# Patient Record
Sex: FEMALE | Race: WHITE | NOT HISPANIC OR LATINO | Employment: UNEMPLOYED | URBAN - METROPOLITAN AREA
[De-identification: names, ages, dates, MRNs, and addresses within clinical notes are randomized per-mention and may not be internally consistent; named-entity substitution may affect disease eponyms.]

---

## 2017-02-10 ENCOUNTER — HISTORICAL (OUTPATIENT)
Dept: RADIOLOGY | Facility: HOSPITAL | Age: 57
End: 2017-02-10

## 2017-07-11 ENCOUNTER — HISTORICAL (OUTPATIENT)
Dept: LAB | Facility: HOSPITAL | Age: 57
End: 2017-07-11

## 2017-07-11 LAB
ABS NEUT (OLG): 6.4 X10(3)/MCL (ref 1.5–6.9)
ALBUMIN SERPL-MCNC: 3.4 GM/DL (ref 3.4–5)
ALBUMIN/GLOB SERPL: 0.7 RATIO
ALP SERPL-CCNC: 118 UNIT/L (ref 30–113)
ALT SERPL-CCNC: 45 UNIT/L (ref 10–45)
APPEARANCE, UA: CLEAR
AST SERPL-CCNC: 18 UNIT/L (ref 15–37)
BACTERIA SPEC CULT: NORMAL /HPF
BASOPHILS # BLD AUTO: 0 X10(3)/MCL (ref 0–0.1)
BASOPHILS NFR BLD AUTO: 0 % (ref 0–1)
BILIRUB SERPL-MCNC: 0.4 MG/DL (ref 0.1–0.9)
BILIRUB UR QL STRIP: NEGATIVE
BILIRUBIN DIRECT+TOT PNL SERPL-MCNC: 0.1 MG/DL (ref 0–0.3)
BILIRUBIN DIRECT+TOT PNL SERPL-MCNC: 0.3 MG/DL
BUN SERPL-MCNC: 10 MG/DL (ref 10–20)
CALCIUM SERPL-MCNC: 9.8 MG/DL (ref 8–10.5)
CHLORIDE SERPL-SCNC: 101 MMOL/L (ref 100–108)
CO2 SERPL-SCNC: 31 MMOL/L (ref 21–35)
COLOR UR: YELLOW
CREAT SERPL-MCNC: 0.86 MG/DL (ref 0.7–1.3)
DEPRECATED CALCIDIOL+CALCIFEROL SERPL-MC: 31.32 NG/ML (ref 30–80)
EOSINOPHIL # BLD AUTO: 0.1 X10(3)/MCL (ref 0–0.6)
EOSINOPHIL NFR BLD AUTO: 1 % (ref 0–5)
ERYTHROCYTE [DISTWIDTH] IN BLOOD BY AUTOMATED COUNT: 15.3 % (ref 11.5–17)
GLOBULIN SER-MCNC: 4.7 GM/DL
GLUCOSE (UA): NEGATIVE
GLUCOSE SERPL-MCNC: 164 MG/DL (ref 75–116)
HCT VFR BLD AUTO: 44.6 % (ref 36–48)
HGB BLD-MCNC: 14 GM/DL (ref 12–16)
HGB UR QL STRIP: NEGATIVE
KETONES UR QL STRIP: NEGATIVE
LEUKOCYTE ESTERASE UR QL STRIP: ABNORMAL
LYMPHOCYTES # BLD AUTO: 2.7 X10(3)/MCL (ref 0.5–4.1)
LYMPHOCYTES NFR BLD AUTO: 26.9 % (ref 15–40)
MCH RBC QN AUTO: 28 PG (ref 27–34)
MCHC RBC AUTO-ENTMCNC: 31 GM/DL (ref 31–36)
MCV RBC AUTO: 90 FL (ref 80–99)
MONOCYTES # BLD AUTO: 0.7 X10(3)/MCL (ref 0–1.1)
MONOCYTES NFR BLD AUTO: 7 % (ref 4–12)
NEUTROPHILS # BLD AUTO: 6.4 X10(3)/MCL (ref 1.5–6.9)
NEUTROPHILS NFR BLD AUTO: 64 % (ref 43–75)
NITRITE UR QL STRIP: NEGATIVE
PH UR STRIP: 6 [PH]
PLATELET # BLD AUTO: 465 X10(3)/MCL (ref 140–400)
PMV BLD AUTO: 8.6 FL (ref 6.8–10)
POTASSIUM SERPL-SCNC: 4.8 MMOL/L (ref 3.6–5.2)
PROT SERPL-MCNC: 8.1 GM/DL (ref 6.4–8.2)
PROT UR QL STRIP: NEGATIVE
RBC # BLD AUTO: 4.97 X10(6)/MCL (ref 4.2–5.4)
RBC #/AREA URNS HPF: NORMAL /[HPF]
SODIUM SERPL-SCNC: 138 MMOL/L (ref 135–145)
SP GR UR STRIP: 1.02
SQUAMOUS EPITHELIAL, UA: NORMAL /LPF
TSH SERPL-ACNC: 3.2 MIU/ML (ref 0.36–3.74)
UROBILINOGEN UR STRIP-ACNC: 0.2 EU/DL
WBC # SPEC AUTO: 10.1 X10(3)/MCL (ref 4.5–11.5)
WBC #/AREA URNS HPF: NORMAL /HPF

## 2017-07-13 LAB — FINAL CULTURE: NO GROWTH

## 2017-07-28 ENCOUNTER — HISTORICAL (OUTPATIENT)
Dept: LAB | Facility: HOSPITAL | Age: 57
End: 2017-07-28

## 2017-07-28 LAB
ALBUMIN SERPL-MCNC: 3.5 GM/DL (ref 3.4–5)
ALBUMIN/GLOB SERPL: 0.8 RATIO
ALP SERPL-CCNC: 128 UNIT/L (ref 30–113)
ALT SERPL-CCNC: 37 UNIT/L (ref 10–45)
AST SERPL-CCNC: 16 UNIT/L (ref 15–37)
BILIRUB SERPL-MCNC: 0.4 MG/DL (ref 0.1–0.9)
BILIRUBIN DIRECT+TOT PNL SERPL-MCNC: 0.1 MG/DL (ref 0–0.3)
BILIRUBIN DIRECT+TOT PNL SERPL-MCNC: 0.3 MG/DL
BUN SERPL-MCNC: 11 MG/DL (ref 10–20)
CALCIUM SERPL-MCNC: 9.3 MG/DL (ref 8–10.5)
CHLORIDE SERPL-SCNC: 101 MMOL/L (ref 100–108)
CHOLEST SERPL-MCNC: 192 MG/DL (ref 140–200)
CHOLEST/HDLC SERPL: 3 MG/DL (ref 35–59)
CO2 SERPL-SCNC: 32 MMOL/L (ref 21–35)
CREAT SERPL-MCNC: 0.85 MG/DL (ref 0.7–1.3)
GLOBULIN SER-MCNC: 4.3 GM/DL
GLUCOSE SERPL-MCNC: 144 MG/DL (ref 75–116)
HDLC SERPL-MCNC: 61 MG/DL (ref 35–59)
LDLC SERPL CALC-MCNC: 106 MG/DL (ref 100–129)
POTASSIUM SERPL-SCNC: 4.8 MMOL/L (ref 3.6–5.2)
PROT SERPL-MCNC: 7.8 GM/DL (ref 6.4–8.2)
SODIUM SERPL-SCNC: 138 MMOL/L (ref 135–145)
TRIGL SERPL-MCNC: 111 MG/DL (ref 35–150)
VLDLC SERPL CALC-MCNC: 22 MG/DL

## 2017-11-27 ENCOUNTER — HISTORICAL (OUTPATIENT)
Dept: LAB | Facility: HOSPITAL | Age: 57
End: 2017-11-27

## 2017-11-27 LAB
CRP SERPL-MCNC: 2.9 MG/DL (ref 0–0.9)
ERYTHROCYTE [SEDIMENTATION RATE] IN BLOOD: 26 MM/HR (ref 0–20)
RHEUMATOID FACT SERPL-ACNC: 10 IU/ML (ref 0–15)
URATE SERPL-MCNC: 5.6 MG/DL (ref 2.6–7.2)

## 2017-12-06 ENCOUNTER — HISTORICAL (OUTPATIENT)
Dept: LAB | Facility: HOSPITAL | Age: 57
End: 2017-12-06

## 2017-12-06 LAB
ALBUMIN SERPL-MCNC: 3.4 GM/DL (ref 3.4–5)
ALBUMIN/GLOB SERPL: 0.8 RATIO
ALP SERPL-CCNC: 115 UNIT/L (ref 30–113)
ALT SERPL-CCNC: 50 UNIT/L (ref 10–45)
AST SERPL-CCNC: 25 UNIT/L (ref 15–37)
BILIRUB SERPL-MCNC: 0.4 MG/DL (ref 0.1–0.9)
BILIRUBIN DIRECT+TOT PNL SERPL-MCNC: 0.1 MG/DL (ref 0–0.3)
BILIRUBIN DIRECT+TOT PNL SERPL-MCNC: 0.3 MG/DL
BUN SERPL-MCNC: 13 MG/DL (ref 10–20)
CALCIUM SERPL-MCNC: 9.2 MG/DL (ref 8–10.5)
CHLORIDE SERPL-SCNC: 98 MMOL/L (ref 100–108)
CHOLEST SERPL-MCNC: 197 MG/DL (ref 140–200)
CHOLEST/HDLC SERPL: 2 MG/DL (ref 0–8)
CO2 SERPL-SCNC: 29 MMOL/L (ref 21–35)
CREAT SERPL-MCNC: 0.87 MG/DL (ref 0.7–1.3)
GLOBULIN SER-MCNC: 4.3 GM/DL
GLUCOSE SERPL-MCNC: 218 MG/DL (ref 75–116)
HDLC SERPL-MCNC: 84 MG/DL (ref 35–59)
LDLC SERPL CALC-MCNC: 103 MG/DL (ref 100–129)
POTASSIUM SERPL-SCNC: 4.4 MMOL/L (ref 3.6–5.2)
PROT SERPL-MCNC: 7.7 GM/DL (ref 6.4–8.2)
SODIUM SERPL-SCNC: 135 MMOL/L (ref 135–145)
TRIGL SERPL-MCNC: 121 MG/DL (ref 35–150)
VLDLC SERPL CALC-MCNC: 24 MG/DL

## 2017-12-19 ENCOUNTER — HISTORICAL (OUTPATIENT)
Dept: SURGERY | Facility: HOSPITAL | Age: 57
End: 2017-12-19

## 2017-12-19 LAB
ABS NEUT (OLG): 6.5 X10(3)/MCL (ref 1.5–6.9)
ALBUMIN SERPL-MCNC: 3.4 GM/DL (ref 3.4–5)
ALBUMIN/GLOB SERPL: 0.8 RATIO
ALP SERPL-CCNC: 115 UNIT/L (ref 30–113)
ALT SERPL-CCNC: 52 UNIT/L (ref 10–45)
APTT PPP: 26.3 SECOND(S) (ref 25–35)
AST SERPL-CCNC: 23 UNIT/L (ref 15–37)
BILIRUB SERPL-MCNC: 0.5 MG/DL (ref 0.1–0.9)
BILIRUBIN DIRECT+TOT PNL SERPL-MCNC: 0.2 MG/DL (ref 0–0.3)
BILIRUBIN DIRECT+TOT PNL SERPL-MCNC: 0.3 MG/DL
BUN SERPL-MCNC: 11 MG/DL (ref 10–20)
CALCIUM SERPL-MCNC: 9.3 MG/DL (ref 8–10.5)
CHLORIDE SERPL-SCNC: 98 MMOL/L (ref 100–108)
CO2 SERPL-SCNC: 28 MMOL/L (ref 21–35)
CREAT SERPL-MCNC: 0.72 MG/DL (ref 0.7–1.3)
ERYTHROCYTE [DISTWIDTH] IN BLOOD BY AUTOMATED COUNT: 14.3 % (ref 11.5–17)
GLOBULIN SER-MCNC: 4.3 GM/DL
GLUCOSE SERPL-MCNC: 183 MG/DL (ref 75–116)
HCT VFR BLD AUTO: 41.6 % (ref 36–48)
HGB BLD-MCNC: 13.1 GM/DL (ref 12–16)
INR PPP: 0.9 (ref 0–1.2)
MCH RBC QN AUTO: 28 PG (ref 27–34)
MCHC RBC AUTO-ENTMCNC: 32 GM/DL (ref 31–36)
MCV RBC AUTO: 90 FL (ref 80–99)
PLATELET # BLD AUTO: 375 X10(3)/MCL (ref 140–400)
PMV BLD AUTO: 9.1 FL (ref 6.8–10)
POTASSIUM SERPL-SCNC: 4.2 MMOL/L (ref 3.6–5.2)
PROT SERPL-MCNC: 7.7 GM/DL (ref 6.4–8.2)
PROTHROMBIN TIME: 9.8 SECOND(S) (ref 9–12)
RBC # BLD AUTO: 4.63 X10(6)/MCL (ref 4.2–5.4)
SODIUM SERPL-SCNC: 135 MMOL/L (ref 135–145)
WBC # SPEC AUTO: 9.1 X10(3)/MCL (ref 4.5–11.5)

## 2017-12-21 ENCOUNTER — HISTORICAL (OUTPATIENT)
Dept: ANESTHESIOLOGY | Facility: HOSPITAL | Age: 57
End: 2017-12-21

## 2018-02-27 ENCOUNTER — HISTORICAL (OUTPATIENT)
Dept: LAB | Facility: HOSPITAL | Age: 58
End: 2018-02-27

## 2018-02-27 LAB
ALBUMIN SERPL-MCNC: 3.4 GM/DL (ref 3.4–5)
ALBUMIN/GLOB SERPL: 0.8 RATIO
ALP SERPL-CCNC: 108 UNIT/L (ref 30–113)
ALT SERPL-CCNC: 37 UNIT/L (ref 10–45)
AST SERPL-CCNC: 22 UNIT/L (ref 15–37)
BILIRUB SERPL-MCNC: 0.3 MG/DL (ref 0.1–0.9)
BILIRUBIN DIRECT+TOT PNL SERPL-MCNC: 0.1 MG/DL (ref 0–0.3)
BILIRUBIN DIRECT+TOT PNL SERPL-MCNC: 0.2 MG/DL
BUN SERPL-MCNC: 9 MG/DL (ref 10–20)
CALCIUM SERPL-MCNC: 8.8 MG/DL (ref 8–10.5)
CHLORIDE SERPL-SCNC: 99 MMOL/L (ref 100–108)
CHOLEST SERPL-MCNC: 153 MG/DL (ref 140–200)
CHOLEST/HDLC SERPL: 2 MG/DL (ref 0–8)
CO2 SERPL-SCNC: 32 MMOL/L (ref 21–35)
CREAT SERPL-MCNC: 0.75 MG/DL (ref 0.7–1.3)
EST. AVERAGE GLUCOSE BLD GHB EST-MCNC: 246 MG/DL
GLOBULIN SER-MCNC: 4.2 GM/DL
GLUCOSE SERPL-MCNC: 201 MG/DL (ref 75–116)
HBA1C MFR BLD: 10.2 % (ref 4.8–6)
HDLC SERPL-MCNC: 74 MG/DL (ref 35–59)
LDLC SERPL CALC-MCNC: 67 MG/DL (ref 100–129)
POTASSIUM SERPL-SCNC: 4.5 MMOL/L (ref 3.6–5.2)
PROT SERPL-MCNC: 7.6 GM/DL (ref 6.4–8.2)
SODIUM SERPL-SCNC: 137 MMOL/L (ref 135–145)
TRIGL SERPL-MCNC: 103 MG/DL (ref 35–150)
VLDLC SERPL CALC-MCNC: 21 MG/DL

## 2018-03-20 ENCOUNTER — HISTORICAL (OUTPATIENT)
Dept: RADIOLOGY | Facility: HOSPITAL | Age: 58
End: 2018-03-20

## 2018-03-27 ENCOUNTER — HISTORICAL (OUTPATIENT)
Dept: SURGERY | Facility: HOSPITAL | Age: 58
End: 2018-03-27

## 2018-03-27 LAB
ABS NEUT (OLG): 8.1 X10(3)/MCL (ref 1.5–6.9)
ALBUMIN SERPL-MCNC: 3.4 GM/DL (ref 3.4–5)
ALBUMIN/GLOB SERPL: 0.7 RATIO
ALP SERPL-CCNC: 98 UNIT/L (ref 30–113)
ALT SERPL-CCNC: 34 UNIT/L (ref 10–45)
APTT PPP: 27.9 SECOND(S) (ref 25–35)
AST SERPL-CCNC: 15 UNIT/L (ref 15–37)
BASOPHILS # BLD AUTO: 0 X10(3)/MCL (ref 0–0.1)
BASOPHILS NFR BLD AUTO: 0 % (ref 0–1)
BILIRUB SERPL-MCNC: 0.3 MG/DL (ref 0.1–0.9)
BILIRUBIN DIRECT+TOT PNL SERPL-MCNC: 0.1 MG/DL (ref 0–0.3)
BILIRUBIN DIRECT+TOT PNL SERPL-MCNC: 0.2 MG/DL
BUN SERPL-MCNC: 14 MG/DL (ref 10–20)
CALCIUM SERPL-MCNC: 8.9 MG/DL (ref 8–10.5)
CHLORIDE SERPL-SCNC: 100 MMOL/L (ref 100–108)
CO2 SERPL-SCNC: 28 MMOL/L (ref 21–35)
CREAT SERPL-MCNC: 0.76 MG/DL (ref 0.7–1.3)
EOSINOPHIL # BLD AUTO: 0.2 X10(3)/MCL (ref 0–0.6)
EOSINOPHIL NFR BLD AUTO: 2 % (ref 0–5)
ERYTHROCYTE [DISTWIDTH] IN BLOOD BY AUTOMATED COUNT: 14.1 % (ref 11.5–17)
GLOBULIN SER-MCNC: 4.6 GM/DL
GLUCOSE SERPL-MCNC: 123 MG/DL (ref 75–116)
GROUP & RH: NORMAL
HCT VFR BLD AUTO: 40 % (ref 36–48)
HGB BLD-MCNC: 12.3 GM/DL (ref 12–16)
INR PPP: 0.9 (ref 0–1.2)
LYMPHOCYTES # BLD AUTO: 2.4 X10(3)/MCL (ref 0.5–4.1)
LYMPHOCYTES NFR BLD AUTO: 20.9 % (ref 15–40)
MCH RBC QN AUTO: 27 PG (ref 27–34)
MCHC RBC AUTO-ENTMCNC: 31 GM/DL (ref 31–36)
MCV RBC AUTO: 89 FL (ref 80–99)
MONOCYTES # BLD AUTO: 0.8 X10(3)/MCL (ref 0–1.1)
MONOCYTES NFR BLD AUTO: 7 % (ref 4–12)
NEUTROPHILS # BLD AUTO: 8.1 X10(3)/MCL (ref 1.5–6.9)
NEUTROPHILS NFR BLD AUTO: 70 % (ref 43–75)
PLATELET # BLD AUTO: 466 X10(3)/MCL (ref 140–400)
PMV BLD AUTO: 8.5 FL (ref 6.8–10)
POTASSIUM SERPL-SCNC: 4.3 MMOL/L (ref 3.6–5.2)
PROT SERPL-MCNC: 8 GM/DL (ref 6.4–8.2)
PROTHROMBIN TIME: 9.6 SECOND(S) (ref 9–12)
RBC # BLD AUTO: 4.5 X10(6)/MCL (ref 4.2–5.4)
SODIUM SERPL-SCNC: 138 MMOL/L (ref 135–145)
WBC # SPEC AUTO: 11.6 X10(3)/MCL (ref 4.5–11.5)

## 2018-03-28 ENCOUNTER — HISTORICAL (OUTPATIENT)
Dept: MEDSURG UNIT | Facility: HOSPITAL | Age: 58
End: 2018-03-28

## 2018-03-29 LAB
ABS NEUT (OLG): 5.4 X10(3)/MCL (ref 1.5–6.9)
ALBUMIN SERPL-MCNC: 2.7 GM/DL (ref 3.4–5)
ALBUMIN/GLOB SERPL: 0.7 RATIO
ALP SERPL-CCNC: 69 UNIT/L (ref 30–113)
ALT SERPL-CCNC: 33 UNIT/L (ref 10–45)
AST SERPL-CCNC: 22 UNIT/L (ref 15–37)
BASOPHILS # BLD AUTO: 0 X10(3)/MCL (ref 0–0.1)
BASOPHILS NFR BLD AUTO: 0 % (ref 0–1)
BILIRUB SERPL-MCNC: 0.3 MG/DL (ref 0.1–0.9)
BILIRUBIN DIRECT+TOT PNL SERPL-MCNC: 0.1 MG/DL (ref 0–0.3)
BILIRUBIN DIRECT+TOT PNL SERPL-MCNC: 0.2 MG/DL
BUN SERPL-MCNC: 9 MG/DL (ref 10–20)
CALCIUM SERPL-MCNC: 7.9 MG/DL (ref 8–10.5)
CHLORIDE SERPL-SCNC: 99 MMOL/L (ref 100–108)
CO2 SERPL-SCNC: 31 MMOL/L (ref 21–35)
CREAT SERPL-MCNC: 0.68 MG/DL (ref 0.7–1.3)
EOSINOPHIL # BLD AUTO: 0.1 X10(3)/MCL (ref 0–0.6)
EOSINOPHIL NFR BLD AUTO: 1 % (ref 0–5)
ERYTHROCYTE [DISTWIDTH] IN BLOOD BY AUTOMATED COUNT: 14.4 % (ref 11.5–17)
GLOBULIN SER-MCNC: 3.9 GM/DL
GLUCOSE SERPL-MCNC: 99 MG/DL (ref 75–116)
HCT VFR BLD AUTO: 34.3 % (ref 36–48)
HGB BLD-MCNC: 10.2 GM/DL (ref 12–16)
LYMPHOCYTES # BLD AUTO: 0.9 X10(3)/MCL (ref 0.5–4.1)
LYMPHOCYTES NFR BLD AUTO: 13 % (ref 15–40)
MAGNESIUM SERPL-MCNC: 1.8 MG/DL (ref 1.8–2.4)
MCH RBC QN AUTO: 27 PG (ref 27–34)
MCHC RBC AUTO-ENTMCNC: 30 GM/DL (ref 31–36)
MCV RBC AUTO: 90 FL (ref 80–99)
MONOCYTES # BLD AUTO: 0.7 X10(3)/MCL (ref 0–1.1)
MONOCYTES NFR BLD AUTO: 10 % (ref 4–12)
NEUTROPHILS # BLD AUTO: 5.4 X10(3)/MCL (ref 1.5–6.9)
NEUTROPHILS NFR BLD AUTO: 76 % (ref 43–75)
PHOSPHATE SERPL-MCNC: 3.4 MG/DL (ref 2.6–4.7)
PLATELET # BLD AUTO: 367 X10(3)/MCL (ref 140–400)
PMV BLD AUTO: 8.3 FL (ref 6.8–10)
POTASSIUM SERPL-SCNC: 4.1 MMOL/L (ref 3.6–5.2)
PROT SERPL-MCNC: 6.6 GM/DL (ref 6.4–8.2)
RBC # BLD AUTO: 3.8 X10(6)/MCL (ref 4.2–5.4)
SODIUM SERPL-SCNC: 137 MMOL/L (ref 135–145)
WBC # SPEC AUTO: 7.2 X10(3)/MCL (ref 4.5–11.5)

## 2018-03-30 LAB — FINAL CULTURE: NORMAL

## 2018-04-02 ENCOUNTER — HOSPITAL ENCOUNTER (OUTPATIENT)
Dept: MEDSURG UNIT | Facility: HOSPITAL | Age: 58
End: 2018-04-04
Attending: INTERNAL MEDICINE | Admitting: INTERNAL MEDICINE

## 2018-04-02 LAB
ABS NEUT (OLG): 11.6 X10(3)/MCL (ref 1.5–6.9)
ALBUMIN SERPL-MCNC: 2.9 GM/DL (ref 3.4–5)
ALBUMIN/GLOB SERPL: 0.6 RATIO
ALP SERPL-CCNC: 466 UNIT/L (ref 30–113)
ALT SERPL-CCNC: 531 UNIT/L (ref 10–45)
AMYLASE SERPL-CCNC: 17 UNIT/L (ref 25–115)
APPEARANCE, UA: CLEAR
AST SERPL-CCNC: 293 UNIT/L (ref 15–37)
BACTERIA SPEC CULT: NORMAL /HPF
BASOPHILS # BLD AUTO: 0 X10(3)/MCL (ref 0–0.1)
BASOPHILS NFR BLD AUTO: 0 % (ref 0–1)
BILIRUB SERPL-MCNC: 1.3 MG/DL (ref 0.1–0.9)
BILIRUB UR QL STRIP: ABNORMAL
BILIRUBIN DIRECT+TOT PNL SERPL-MCNC: 0.3 MG/DL
BILIRUBIN DIRECT+TOT PNL SERPL-MCNC: 1 MG/DL (ref 0–0.3)
BUN SERPL-MCNC: 7 MG/DL (ref 10–20)
CALCIUM SERPL-MCNC: 9 MG/DL (ref 8–10.5)
CHLORIDE SERPL-SCNC: 95 MMOL/L (ref 100–108)
CO2 SERPL-SCNC: 23 MMOL/L (ref 21–35)
COLOR UR: ABNORMAL
CREAT SERPL-MCNC: 0.93 MG/DL (ref 0.7–1.3)
EOSINOPHIL # BLD AUTO: 0.1 X10(3)/MCL (ref 0–0.6)
EOSINOPHIL NFR BLD AUTO: 1 % (ref 0–5)
ERYTHROCYTE [DISTWIDTH] IN BLOOD BY AUTOMATED COUNT: 14.2 % (ref 11.5–17)
GLOBULIN SER-MCNC: 5.2 GM/DL
GLUCOSE (UA): 100 MG/DL
GLUCOSE SERPL-MCNC: 148 MG/DL (ref 75–116)
HCT VFR BLD AUTO: 35.2 % (ref 36–48)
HGB BLD-MCNC: 11.2 GM/DL (ref 12–16)
HGB UR QL STRIP: NEGATIVE
KETONES UR QL STRIP: ABNORMAL
LACTATE SERPL-SCNC: 3 MMOL/L (ref 0.4–2)
LEUKOCYTE ESTERASE UR QL STRIP: NEGATIVE
LIPASE SERPL-CCNC: 39 UNIT/L (ref 21–261)
LYMPHOCYTES # BLD AUTO: 0.4 X10(3)/MCL (ref 0.5–4.1)
LYMPHOCYTES NFR BLD AUTO: 3.2 % (ref 15–40)
MCH RBC QN AUTO: 28 PG (ref 27–34)
MCHC RBC AUTO-ENTMCNC: 32 GM/DL (ref 31–36)
MCV RBC AUTO: 88 FL (ref 80–99)
MONOCYTES # BLD AUTO: 0.5 X10(3)/MCL (ref 0–1.1)
MONOCYTES NFR BLD AUTO: 4 % (ref 4–12)
NEUTROPHILS # BLD AUTO: 11.6 X10(3)/MCL (ref 1.5–6.9)
NEUTROPHILS NFR BLD AUTO: 92 % (ref 43–75)
NITRITE UR QL STRIP: NEGATIVE
PH UR STRIP: 6.5 [PH]
PLATELET # BLD AUTO: 494 X10(3)/MCL (ref 140–400)
PMV BLD AUTO: 8.4 FL (ref 6.8–10)
POTASSIUM SERPL-SCNC: 5.1 MMOL/L (ref 3.6–5.2)
PROT SERPL-MCNC: 8.1 GM/DL (ref 6.4–8.2)
PROT UR QL STRIP: 30 MG/DL
RBC # BLD AUTO: 4.01 X10(6)/MCL (ref 4.2–5.4)
RBC #/AREA URNS HPF: NORMAL /HPF
SODIUM SERPL-SCNC: 130 MMOL/L (ref 135–145)
SP GR UR STRIP: 1.01
SQUAMOUS EPITHELIAL, UA: NORMAL
TSH SERPL-ACNC: 2.42 MIU/ML (ref 0.36–3.74)
UROBILINOGEN UR STRIP-ACNC: 4 EU/DL
WBC # SPEC AUTO: 12.7 X10(3)/MCL (ref 4.5–11.5)
WBC #/AREA URNS HPF: NORMAL /HPF

## 2018-04-03 LAB
APAP SERPL-MCNC: 0 MCG/ML (ref 10–30)
CK MB SERPL-MCNC: 0.5 NG/ML (ref 0–3.6)
CK SERPL-CCNC: 39 UNIT/L (ref 39–225)
LACTATE SERPL-SCNC: 1.3 MMOL/L (ref 0.4–2)
LACTATE SERPL-SCNC: 1.8 MMOL/L (ref 0.4–2)
TROPONIN I SERPL-MCNC: <0.017 NG/ML (ref 0–0.06)

## 2018-04-04 LAB
ABS NEUT (OLG): 4.9 X10(3)/MCL (ref 1.5–6.9)
ALBUMIN SERPL-MCNC: 2.1 GM/DL (ref 3.4–5)
ALBUMIN/GLOB SERPL: 0.5 RATIO
ALP SERPL-CCNC: 334 UNIT/L (ref 30–113)
ALT SERPL-CCNC: 316 UNIT/L (ref 10–45)
AST SERPL-CCNC: 93 UNIT/L (ref 15–37)
BASOPHILS # BLD AUTO: 0 X10(3)/MCL (ref 0–0.1)
BASOPHILS NFR BLD AUTO: 0 % (ref 0–1)
BILIRUB SERPL-MCNC: 0.8 MG/DL (ref 0.1–0.9)
BILIRUBIN DIRECT+TOT PNL SERPL-MCNC: 0.2 MG/DL
BILIRUBIN DIRECT+TOT PNL SERPL-MCNC: 0.6 MG/DL (ref 0–0.3)
BUN SERPL-MCNC: 5 MG/DL (ref 10–20)
CALCIUM SERPL-MCNC: 8 MG/DL (ref 8–10.5)
CHLORIDE SERPL-SCNC: 104 MMOL/L (ref 100–108)
CO2 SERPL-SCNC: 24 MMOL/L (ref 21–35)
CREAT SERPL-MCNC: 0.64 MG/DL (ref 0.7–1.3)
EOSINOPHIL # BLD AUTO: 0.2 X10(3)/MCL (ref 0–0.6)
EOSINOPHIL NFR BLD AUTO: 3 % (ref 0–5)
ERYTHROCYTE [DISTWIDTH] IN BLOOD BY AUTOMATED COUNT: 14.8 % (ref 11.5–17)
GLOBULIN SER-MCNC: 4.2 GM/DL
GLUCOSE SERPL-MCNC: 155 MG/DL (ref 75–116)
HCT VFR BLD AUTO: 31.6 % (ref 36–48)
HGB BLD-MCNC: 9.4 GM/DL (ref 12–16)
INR PPP: 0.9 (ref 0–1.2)
LYMPHOCYTES # BLD AUTO: 1.1 X10(3)/MCL (ref 0.5–4.1)
LYMPHOCYTES NFR BLD AUTO: 16.4 % (ref 15–40)
MAGNESIUM SERPL-MCNC: 1.6 MG/DL (ref 1.8–2.4)
MCH RBC QN AUTO: 26 PG (ref 27–34)
MCHC RBC AUTO-ENTMCNC: 30 GM/DL (ref 31–36)
MCV RBC AUTO: 88 FL (ref 80–99)
MONOCYTES # BLD AUTO: 0.5 X10(3)/MCL (ref 0–1.1)
MONOCYTES NFR BLD AUTO: 8 % (ref 4–12)
NEUTROPHILS # BLD AUTO: 4.9 X10(3)/MCL (ref 1.5–6.9)
NEUTROPHILS NFR BLD AUTO: 71 % (ref 43–75)
PHOSPHATE SERPL-MCNC: 3.1 MG/DL (ref 2.6–4.7)
PLATELET # BLD AUTO: 506 X10(3)/MCL (ref 140–400)
PMV BLD AUTO: 8 FL (ref 6.8–10)
POTASSIUM SERPL-SCNC: 4 MMOL/L (ref 3.6–5.2)
PROT SERPL-MCNC: 6.3 GM/DL (ref 6.4–8.2)
PROTHROMBIN TIME: 10 SECOND(S) (ref 9–12)
RBC # BLD AUTO: 3.58 X10(6)/MCL (ref 4.2–5.4)
SODIUM SERPL-SCNC: 137 MMOL/L (ref 135–145)
WBC # SPEC AUTO: 6.9 X10(3)/MCL (ref 4.5–11.5)

## 2018-04-06 ENCOUNTER — HISTORICAL (OUTPATIENT)
Dept: LAB | Facility: HOSPITAL | Age: 58
End: 2018-04-06

## 2018-04-06 LAB
ABS NEUT (OLG): 6 X10(3)/MCL (ref 1.5–6.9)
ALBUMIN SERPL-MCNC: 2.6 GM/DL (ref 3.4–5)
ALBUMIN/GLOB SERPL: 0.5 RATIO
ALP SERPL-CCNC: 413 UNIT/L (ref 30–113)
ALT SERPL-CCNC: 319 UNIT/L (ref 10–45)
AMYLASE SERPL-CCNC: 24 UNIT/L (ref 25–115)
AST SERPL-CCNC: 97 UNIT/L (ref 15–37)
BASOPHILS # BLD AUTO: 0 X10(3)/MCL (ref 0–0.1)
BASOPHILS NFR BLD AUTO: 0 % (ref 0–1)
BILIRUB SERPL-MCNC: 0.7 MG/DL (ref 0.1–0.9)
BILIRUBIN DIRECT+TOT PNL SERPL-MCNC: 0.3 MG/DL
BILIRUBIN DIRECT+TOT PNL SERPL-MCNC: 0.4 MG/DL (ref 0–0.3)
BUN SERPL-MCNC: 4 MG/DL (ref 10–20)
CALCIUM SERPL-MCNC: 8.6 MG/DL (ref 8–10.5)
CHLORIDE SERPL-SCNC: 101 MMOL/L (ref 100–108)
CO2 SERPL-SCNC: 27 MMOL/L (ref 21–35)
CREAT SERPL-MCNC: 0.75 MG/DL (ref 0.7–1.3)
CRP SERPL-MCNC: 7.1 MG/DL (ref 0–0.9)
EOSINOPHIL # BLD AUTO: 0.4 X10(3)/MCL (ref 0–0.6)
EOSINOPHIL NFR BLD AUTO: 4 % (ref 0–5)
ERYTHROCYTE [DISTWIDTH] IN BLOOD BY AUTOMATED COUNT: 14.8 % (ref 11.5–17)
ERYTHROCYTE [SEDIMENTATION RATE] IN BLOOD: 91 MM/HR (ref 0–20)
EST. AVERAGE GLUCOSE BLD GHB EST-MCNC: 203 MG/DL
FERRITIN SERPL-MCNC: 227 NG/ML (ref 3–252)
FOLATE SERPL-MCNC: 16.6 NG/ML (ref 8.6–58.9)
GGT SERPL-CCNC: 993 UNIT/L (ref 5–85)
GLOBULIN SER-MCNC: 5 GM/DL
GLUCOSE SERPL-MCNC: 119 MG/DL (ref 75–116)
HBA1C MFR BLD: 8.7 % (ref 4.8–6)
HCT VFR BLD AUTO: 33 % (ref 36–48)
HGB BLD-MCNC: 10.3 GM/DL (ref 12–16)
IRON SATN MFR SERPL: 14 % (ref 15–55)
IRON SERPL-MCNC: 42 MCG/DL (ref 50–170)
LIPASE SERPL-CCNC: 46 UNIT/L (ref 21–261)
LYMPHOCYTES # BLD AUTO: 2 X10(3)/MCL (ref 0.5–4.1)
LYMPHOCYTES NFR BLD AUTO: 21.3 % (ref 15–40)
MCH RBC QN AUTO: 28 PG (ref 27–34)
MCHC RBC AUTO-ENTMCNC: 31 GM/DL (ref 31–36)
MCV RBC AUTO: 88 FL (ref 80–99)
MONOCYTES # BLD AUTO: 0.8 X10(3)/MCL (ref 0–1.1)
MONOCYTES NFR BLD AUTO: 8 % (ref 4–12)
NEUTROPHILS # BLD AUTO: 6 X10(3)/MCL (ref 1.5–6.9)
NEUTROPHILS NFR BLD AUTO: 65 % (ref 43–75)
PLATELET # BLD AUTO: 598 X10(3)/MCL (ref 140–400)
PMV BLD AUTO: 8.2 FL (ref 6.8–10)
POTASSIUM SERPL-SCNC: 4.1 MMOL/L (ref 3.6–5.2)
PROT SERPL-MCNC: 7.6 GM/DL (ref 6.4–8.2)
RBC # BLD AUTO: 3.75 X10(6)/MCL (ref 4.2–5.4)
RHEUMATOID FACT SERPL-ACNC: <10 IU/ML (ref 0–15)
SODIUM SERPL-SCNC: 137 MMOL/L (ref 135–145)
T4 FREE SERPL-MCNC: 0.93 NG/DL (ref 0.76–1.46)
TIBC SERPL-MCNC: 266 MCG/DL (ref 150–375)
TIBC SERPL-MCNC: 308 MCG/DL (ref 250–450)
TSH SERPL-ACNC: 4.15 MIU/ML (ref 0.36–3.74)
VIT B12 SERPL-MCNC: 1821 PG/ML (ref 193–986)
WBC # SPEC AUTO: 9.2 X10(3)/MCL (ref 4.5–11.5)

## 2018-04-08 LAB — FINAL CULTURE: NORMAL

## 2018-04-30 ENCOUNTER — HISTORICAL (OUTPATIENT)
Dept: LAB | Facility: HOSPITAL | Age: 58
End: 2018-04-30

## 2018-04-30 LAB
ALBUMIN SERPL-MCNC: 3.4 GM/DL (ref 3.4–5)
ALBUMIN/GLOB SERPL: 0.7 RATIO
ALP SERPL-CCNC: 134 UNIT/L (ref 30–113)
ALT SERPL-CCNC: 35 UNIT/L (ref 10–45)
AST SERPL-CCNC: 18 UNIT/L (ref 15–37)
BILIRUB SERPL-MCNC: 0.4 MG/DL (ref 0.1–0.9)
BILIRUBIN DIRECT+TOT PNL SERPL-MCNC: 0.2 MG/DL
BILIRUBIN DIRECT+TOT PNL SERPL-MCNC: 0.2 MG/DL (ref 0–0.3)
BUN SERPL-MCNC: 9 MG/DL (ref 10–20)
CALCIUM SERPL-MCNC: 9.1 MG/DL (ref 8–10.5)
CHLORIDE SERPL-SCNC: 97 MMOL/L (ref 100–108)
CO2 SERPL-SCNC: 30 MMOL/L (ref 21–35)
CREAT SERPL-MCNC: 0.75 MG/DL (ref 0.7–1.3)
GGT SERPL-CCNC: 181 UNIT/L (ref 5–85)
GLOBULIN SER-MCNC: 5 GM/DL
GLUCOSE SERPL-MCNC: 119 MG/DL (ref 75–116)
POTASSIUM SERPL-SCNC: 4.7 MMOL/L (ref 3.6–5.2)
PROT SERPL-MCNC: 8.4 GM/DL (ref 6.4–8.2)
SODIUM SERPL-SCNC: 136 MMOL/L (ref 135–145)

## 2018-05-04 ENCOUNTER — HISTORICAL (OUTPATIENT)
Dept: LAB | Facility: HOSPITAL | Age: 58
End: 2018-05-04

## 2018-05-04 LAB
ALBUMIN SERPL-MCNC: 3.4 GM/DL (ref 3.4–5)
ALBUMIN/GLOB SERPL: 0.6 RATIO
ALP SERPL-CCNC: 144 UNIT/L (ref 30–113)
ALT SERPL-CCNC: 44 UNIT/L (ref 10–45)
AMYLASE SERPL-CCNC: 29 UNIT/L (ref 25–115)
AST SERPL-CCNC: 18 UNIT/L (ref 15–37)
BILIRUB SERPL-MCNC: 0.3 MG/DL (ref 0.1–0.9)
BILIRUBIN DIRECT+TOT PNL SERPL-MCNC: 0.1 MG/DL (ref 0–0.3)
BILIRUBIN DIRECT+TOT PNL SERPL-MCNC: 0.2 MG/DL
BUN SERPL-MCNC: 11 MG/DL (ref 10–20)
CALCIUM SERPL-MCNC: 9.6 MG/DL (ref 8–10.5)
CHLORIDE SERPL-SCNC: 98 MMOL/L (ref 100–108)
CO2 SERPL-SCNC: 31 MMOL/L (ref 21–35)
CREAT SERPL-MCNC: 0.77 MG/DL (ref 0.7–1.3)
CRP SERPL-MCNC: 4.9 MG/DL (ref 0–0.9)
ERYTHROCYTE [SEDIMENTATION RATE] IN BLOOD: 77 MM/HR (ref 0–20)
FERRITIN SERPL-MCNC: 69 NG/ML (ref 3–252)
FOLATE SERPL-MCNC: 16.8 NG/ML (ref 8.6–58.9)
GGT SERPL-CCNC: 175 UNIT/L (ref 5–85)
GLOBULIN SER-MCNC: 5.3 GM/DL
GLUCOSE SERPL-MCNC: 135 MG/DL (ref 75–116)
IRON SATN MFR SERPL: 9 % (ref 15–55)
IRON SERPL-MCNC: 28 MCG/DL (ref 50–170)
LIPASE SERPL-CCNC: 55 UNIT/L (ref 21–261)
POTASSIUM SERPL-SCNC: 4.3 MMOL/L (ref 3.6–5.2)
PROT SERPL-MCNC: 8.7 GM/DL (ref 6.4–8.2)
SODIUM SERPL-SCNC: 137 MMOL/L (ref 135–145)
T4 FREE SERPL-MCNC: 1.05 NG/DL (ref 0.76–1.46)
TIBC SERPL-MCNC: 286 MCG/DL (ref 150–375)
TIBC SERPL-MCNC: 314 MCG/DL (ref 250–450)
TSH SERPL-ACNC: 4.25 MIU/ML (ref 0.36–3.74)
VIT B12 SERPL-MCNC: 1115 PG/ML (ref 193–986)

## 2018-07-05 ENCOUNTER — HOSPITAL ENCOUNTER (OUTPATIENT)
Dept: MEDSURG UNIT | Facility: HOSPITAL | Age: 58
End: 2018-07-06
Attending: INTERNAL MEDICINE | Admitting: INTERNAL MEDICINE

## 2018-07-05 LAB
ABS NEUT (OLG): 8.34 X10(3)/MCL (ref 2.1–9.2)
ALBUMIN SERPL-MCNC: 3.2 GM/DL (ref 3.4–5)
ALBUMIN/GLOB SERPL: 0.8 RATIO (ref 1.1–2)
ALP SERPL-CCNC: 120 UNIT/L (ref 38–126)
ALT SERPL-CCNC: 46 UNIT/L (ref 12–78)
AST SERPL-CCNC: 20 UNIT/L (ref 15–37)
BASOPHILS # BLD AUTO: 0.1 X10(3)/MCL (ref 0–0.2)
BASOPHILS NFR BLD AUTO: 1 %
BILIRUB SERPL-MCNC: 0.2 MG/DL (ref 0.2–1)
BILIRUBIN DIRECT+TOT PNL SERPL-MCNC: 0.1 MG/DL (ref 0–0.5)
BILIRUBIN DIRECT+TOT PNL SERPL-MCNC: 0.1 MG/DL (ref 0–0.8)
BNP BLD-MCNC: 51 PG/ML (ref 0–100)
BUN SERPL-MCNC: 12 MG/DL (ref 7–18)
CALCIUM SERPL-MCNC: 8.7 MG/DL (ref 8.5–10.1)
CHLORIDE SERPL-SCNC: 100 MMOL/L (ref 98–107)
CO2 SERPL-SCNC: 29 MMOL/L (ref 21–32)
CREAT SERPL-MCNC: 0.91 MG/DL (ref 0.55–1.02)
EOSINOPHIL # BLD AUTO: 0.2 X10(3)/MCL (ref 0–0.9)
EOSINOPHIL NFR BLD AUTO: 2 %
ERYTHROCYTE [DISTWIDTH] IN BLOOD BY AUTOMATED COUNT: 14.1 % (ref 11.5–17)
GLOBULIN SER-MCNC: 3.9 GM/DL (ref 2.4–3.5)
GLUCOSE SERPL-MCNC: 124 MG/DL (ref 74–106)
HCT VFR BLD AUTO: 35.8 % (ref 37–47)
HGB BLD-MCNC: 10.5 GM/DL (ref 12–16)
LYMPHOCYTES # BLD AUTO: 2.2 X10(3)/MCL (ref 0.6–4.6)
LYMPHOCYTES NFR BLD AUTO: 19 %
MAGNESIUM SERPL-MCNC: 1.7 MG/DL (ref 1.8–2.4)
MCH RBC QN AUTO: 26.4 PG (ref 27–31)
MCHC RBC AUTO-ENTMCNC: 29.3 GM/DL (ref 33–36)
MCV RBC AUTO: 89.9 FL (ref 80–94)
MONOCYTES # BLD AUTO: 0.7 X10(3)/MCL (ref 0.1–1.3)
MONOCYTES NFR BLD AUTO: 6 %
NEUTROPHILS # BLD AUTO: 8.34 X10(3)/MCL (ref 1.4–7.9)
NEUTROPHILS NFR BLD AUTO: 72 %
PLATELET # BLD AUTO: 391 X10(3)/MCL (ref 130–400)
PMV BLD AUTO: 8.1 FL (ref 9.4–12.4)
POC TROPONIN: 0 NG/ML (ref 0–0.08)
POTASSIUM SERPL-SCNC: 4.1 MMOL/L (ref 3.5–5.1)
PROT SERPL-MCNC: 7.1 GM/DL (ref 6.4–8.2)
RBC # BLD AUTO: 3.98 X10(6)/MCL (ref 4.2–5.4)
SODIUM SERPL-SCNC: 136 MMOL/L (ref 136–145)
WBC # SPEC AUTO: 11.5 X10(3)/MCL (ref 4.5–11.5)

## 2018-07-06 LAB
APTT PPP: 29.4 SECOND(S) (ref 24.8–36.9)
INR PPP: 0.96 (ref 0–1.27)
PROTHROMBIN TIME: 13.1 SECOND(S) (ref 12.2–14.7)
TROPONIN I SERPL-MCNC: <0.02 NG/ML (ref 0.02–0.49)

## 2018-09-04 ENCOUNTER — HISTORICAL (OUTPATIENT)
Dept: RADIOLOGY | Facility: HOSPITAL | Age: 58
End: 2018-09-04

## 2018-12-06 ENCOUNTER — HISTORICAL (OUTPATIENT)
Dept: LAB | Facility: HOSPITAL | Age: 58
End: 2018-12-06

## 2018-12-06 LAB
ABS NEUT (OLG): 6.2 X10(3)/MCL (ref 1.5–6.9)
ALBUMIN SERPL-MCNC: 3.4 GM/DL (ref 3.4–5)
ALBUMIN/GLOB SERPL: 0.8 RATIO
ALP SERPL-CCNC: 116 UNIT/L (ref 30–113)
ALT SERPL-CCNC: 40 UNIT/L (ref 10–45)
AST SERPL-CCNC: 15 UNIT/L (ref 15–37)
BASOPHILS # BLD AUTO: 0.1 X10(3)/MCL (ref 0–0.1)
BASOPHILS NFR BLD AUTO: 1 % (ref 0–1)
BILIRUB SERPL-MCNC: 0.2 MG/DL (ref 0.1–0.9)
BILIRUBIN DIRECT+TOT PNL SERPL-MCNC: 0.1 MG/DL
BILIRUBIN DIRECT+TOT PNL SERPL-MCNC: 0.1 MG/DL (ref 0–0.3)
BUN SERPL-MCNC: 8 MG/DL (ref 10–20)
CALCIUM SERPL-MCNC: 9 MG/DL (ref 8–10.5)
CHLORIDE SERPL-SCNC: 95 MMOL/L (ref 100–108)
CHOLEST SERPL-MCNC: 154 MG/DL (ref 140–200)
CHOLEST/HDLC SERPL: 2 MG/DL (ref 0–8)
CK SERPL-CCNC: 45 UNIT/L (ref 39–225)
CO2 SERPL-SCNC: 30 MMOL/L (ref 21–35)
CREAT SERPL-MCNC: 0.82 MG/DL (ref 0.7–1.3)
EOSINOPHIL # BLD AUTO: 0.1 X10(3)/MCL (ref 0–0.6)
EOSINOPHIL NFR BLD AUTO: 1 % (ref 0–5)
ERYTHROCYTE [DISTWIDTH] IN BLOOD BY AUTOMATED COUNT: 15.4 % (ref 11.5–17)
ERYTHROCYTE [SEDIMENTATION RATE] IN BLOOD: 41 MM/HR (ref 0–20)
EST. AVERAGE GLUCOSE BLD GHB EST-MCNC: 180 MG/DL
GGT SERPL-CCNC: 167 UNIT/L (ref 5–85)
GLOBULIN SER-MCNC: 4.1 GM/DL
GLUCOSE SERPL-MCNC: 152 MG/DL (ref 75–116)
HBA1C MFR BLD: 7.9 % (ref 4.8–6)
HCT VFR BLD AUTO: 37 % (ref 36–48)
HDLC SERPL-MCNC: 62 MG/DL (ref 35–59)
HGB BLD-MCNC: 10.9 GM/DL (ref 12–16)
HYPOCHROMIA BLD QL SMEAR: ABNORMAL
IMM GRANULOCYTES # BLD AUTO: 0.04 10*3/UL (ref 0–0.02)
IMM GRANULOCYTES NFR BLD AUTO: 0.4 % (ref 0–0.43)
LDLC SERPL CALC-MCNC: 72 MG/DL (ref 100–129)
LYMPHOCYTES # BLD AUTO: 2.6 X10(3)/MCL (ref 0.5–4.1)
LYMPHOCYTES NFR BLD AUTO: 28 % (ref 15–40)
MCH RBC QN AUTO: 26 PG (ref 27–34)
MCHC RBC AUTO-ENTMCNC: 30 GM/DL (ref 31–36)
MCV RBC AUTO: 86 FL (ref 80–99)
MONOCYTES # BLD AUTO: 0.5 X10(3)/MCL (ref 0–1.1)
MONOCYTES NFR BLD AUTO: 6 % (ref 4–12)
NEUTROPHILS # BLD AUTO: 6.2 X10(3)/MCL (ref 1.5–6.9)
NEUTROPHILS NFR BLD AUTO: 64 % (ref 43–75)
PLATELET # BLD AUTO: 468 X10(3)/MCL (ref 140–400)
PLATELET # BLD EST: ABNORMAL 10*3/UL
PMV BLD AUTO: 8.2 FL (ref 6.8–10)
POTASSIUM SERPL-SCNC: 4 MMOL/L (ref 3.6–5.2)
PROT SERPL-MCNC: 7.5 GM/DL (ref 6.4–8.2)
RBC # BLD AUTO: 4.28 X10(6)/MCL (ref 4.2–5.4)
RHEUMATOID FACT SERPL-ACNC: <10 IU/ML (ref 0–15)
SODIUM SERPL-SCNC: 133 MMOL/L (ref 135–145)
TRIGL SERPL-MCNC: 149 MG/DL (ref 35–150)
TSH SERPL-ACNC: 2.1 MIU/ML (ref 0.36–3.74)
VLDLC SERPL CALC-MCNC: 30 MG/DL
WBC # SPEC AUTO: 9.6 X10(3)/MCL (ref 4.5–11.5)

## 2019-02-07 ENCOUNTER — HISTORICAL (OUTPATIENT)
Dept: RESPIRATORY THERAPY | Facility: HOSPITAL | Age: 59
End: 2019-02-07

## 2019-04-11 ENCOUNTER — HISTORICAL (OUTPATIENT)
Dept: LAB | Facility: HOSPITAL | Age: 59
End: 2019-04-11

## 2019-04-11 LAB
ABS NEUT (OLG): 6.4 X10(3)/MCL (ref 1.5–6.9)
ALBUMIN SERPL-MCNC: 3.6 GM/DL (ref 3.4–5)
ALBUMIN/GLOB SERPL: 0.8 RATIO
ALP SERPL-CCNC: 122 UNIT/L (ref 30–113)
ALT SERPL-CCNC: 61 UNIT/L (ref 10–45)
AMYLASE SERPL-CCNC: 31 UNIT/L (ref 25–115)
APPEARANCE, UA: CLEAR
AST SERPL-CCNC: 30 UNIT/L (ref 15–37)
BACTERIA SPEC CULT: ABNORMAL /HPF
BASOPHILS # BLD AUTO: 0.1 X10(3)/MCL (ref 0–0.1)
BASOPHILS NFR BLD AUTO: 1 % (ref 0–1)
BILIRUB SERPL-MCNC: 0.4 MG/DL (ref 0.1–0.9)
BILIRUB UR QL STRIP: NEGATIVE
BILIRUBIN DIRECT+TOT PNL SERPL-MCNC: 0.1 MG/DL (ref 0–0.3)
BILIRUBIN DIRECT+TOT PNL SERPL-MCNC: 0.3 MG/DL
BUN SERPL-MCNC: 11 MG/DL (ref 10–20)
CALCIUM SERPL-MCNC: 9.6 MG/DL (ref 8–10.5)
CHLORIDE SERPL-SCNC: 95 MMOL/L (ref 100–108)
CK SERPL-CCNC: 49 UNIT/L (ref 39–225)
CO2 SERPL-SCNC: 34 MMOL/L (ref 21–35)
COLOR UR: ABNORMAL
CREAT SERPL-MCNC: 0.85 MG/DL (ref 0.7–1.3)
CRP SERPL-MCNC: 2.3 MG/DL (ref 0–0.9)
EOSINOPHIL # BLD AUTO: 0.1 X10(3)/MCL (ref 0–0.6)
EOSINOPHIL NFR BLD AUTO: 1 % (ref 0–5)
ERYTHROCYTE [DISTWIDTH] IN BLOOD BY AUTOMATED COUNT: 14.9 % (ref 11.5–17)
ERYTHROCYTE [SEDIMENTATION RATE] IN BLOOD: 44 MM/HR (ref 0–20)
EST. AVERAGE GLUCOSE BLD GHB EST-MCNC: 194 MG/DL
GLOBULIN SER-MCNC: 4.3 GM/DL
GLUCOSE (UA): NEGATIVE
GLUCOSE SERPL-MCNC: 155 MG/DL (ref 75–116)
HBA1C MFR BLD: 8.4 % (ref 4.8–6)
HCT VFR BLD AUTO: 41.1 % (ref 36–48)
HGB BLD-MCNC: 12.1 GM/DL (ref 12–16)
HGB UR QL STRIP: NEGATIVE
HYALINE CASTS #/AREA URNS LPF: ABNORMAL /LPF
IMM GRANULOCYTES # BLD AUTO: 0.04 10*3/UL (ref 0–0.02)
IMM GRANULOCYTES NFR BLD AUTO: 0.4 % (ref 0–0.43)
KETONES UR QL STRIP: ABNORMAL
LDH SERPL-CCNC: 127 UNIT/L (ref 112–240)
LEUKOCYTE ESTERASE UR QL STRIP: NEGATIVE
LIPASE SERPL-CCNC: 134 UNIT/L (ref 21–261)
LYMPHOCYTES # BLD AUTO: 2.9 X10(3)/MCL (ref 0.5–4.1)
LYMPHOCYTES NFR BLD AUTO: 29 % (ref 15–40)
MAGNESIUM SERPL-MCNC: 1.9 MG/DL (ref 1.8–2.4)
MCH RBC QN AUTO: 26 PG (ref 27–34)
MCHC RBC AUTO-ENTMCNC: 29 GM/DL (ref 31–36)
MCV RBC AUTO: 87 FL (ref 80–99)
MONOCYTES # BLD AUTO: 0.7 X10(3)/MCL (ref 0–1.1)
MONOCYTES NFR BLD AUTO: 7 % (ref 4–12)
NEUTROPHILS # BLD AUTO: 6.4 X10(3)/MCL (ref 1.5–6.9)
NEUTROPHILS NFR BLD AUTO: 62 % (ref 43–75)
NITRITE UR QL STRIP: NEGATIVE
PH UR STRIP: 6 [PH]
PLATELET # BLD AUTO: 489 X10(3)/MCL (ref 140–400)
PMV BLD AUTO: 8.1 FL (ref 6.8–10)
POTASSIUM SERPL-SCNC: 4.9 MMOL/L (ref 3.6–5.2)
PROT SERPL-MCNC: 7.9 GM/DL (ref 6.4–8.2)
PROT UR QL STRIP: ABNORMAL
RBC # BLD AUTO: 4.73 X10(6)/MCL (ref 4.2–5.4)
RBC #/AREA URNS HPF: ABNORMAL /[HPF]
SODIUM SERPL-SCNC: 133 MMOL/L (ref 135–145)
SP GR UR STRIP: 1.02
SQUAMOUS EPITHELIAL, UA: ABNORMAL /LPF
T4 SERPL-MCNC: 8.7 MCG/DL (ref 5.3–11.5)
TSH SERPL-ACNC: 2.16 MIU/ML (ref 0.36–3.74)
UROBILINOGEN UR STRIP-ACNC: 0.2 EU/DL
VIT B12 SERPL-MCNC: 1135 PG/ML (ref 193–986)
WBC # SPEC AUTO: 10.3 X10(3)/MCL (ref 4.5–11.5)
WBC #/AREA URNS HPF: ABNORMAL /HPF

## 2019-04-13 LAB — FINAL CULTURE: NO GROWTH

## 2019-07-31 LAB
ABS NEUT (OLG): 8.33 X10(3)/MCL (ref 2.1–9.2)
ALBUMIN SERPL-MCNC: 2.3 GM/DL (ref 3.4–5)
ALBUMIN/GLOB SERPL: 0.5 RATIO (ref 1–2)
ALP SERPL-CCNC: 200 UNIT/L (ref 45–117)
ALT SERPL-CCNC: 90 UNIT/L (ref 13–56)
AST SERPL-CCNC: 40 UNIT/L (ref 15–37)
BASOPHILS # BLD AUTO: 0.05 X10(3)/MCL (ref 0–0.2)
BASOPHILS NFR BLD AUTO: 0.5 % (ref 0–1)
BILIRUB SERPL-MCNC: 0.6 MG/DL (ref 0.2–1)
BILIRUBIN DIRECT+TOT PNL SERPL-MCNC: 0.29 MG/DL (ref 0–1)
BILIRUBIN DIRECT+TOT PNL SERPL-MCNC: 0.31 MG/DL (ref 0–0.2)
BUN SERPL-MCNC: 25 MG/DL (ref 7–18)
CALCIUM SERPL-MCNC: 8.8 MG/DL (ref 8.5–10.1)
CHLORIDE SERPL-SCNC: 106 MMOL/L (ref 98–107)
CO2 SERPL-SCNC: 34 MMOL/L (ref 21–32)
CREAT SERPL-MCNC: 0.59 MG/DL (ref 0.55–1.02)
EOSINOPHIL # BLD AUTO: 0.2 X10(3)/MCL (ref 0–0.9)
EOSINOPHIL NFR BLD AUTO: 1.9 % (ref 0–6.4)
ERYTHROCYTE [DISTWIDTH] IN BLOOD BY AUTOMATED COUNT: 16.7 % (ref 11.5–17)
GLOBULIN SER-MCNC: 5 GM/DL (ref 2–4)
GLUCOSE SERPL-MCNC: 212 MG/DL (ref 74–106)
HCO3 UR-SCNC: 41.3 MMOL/L (ref 22–26)
HCT VFR BLD AUTO: 27.3 % (ref 37–47)
HGB BLD-MCNC: 8.1 GM/DL (ref 12–16)
HYPOCHROMIA BLD QL SMEAR: NORMAL
IMM GRANULOCYTES # BLD AUTO: 0.07 10*3/UL (ref 0–0.02)
IMM GRANULOCYTES NFR BLD AUTO: 0.6 % (ref 0–0.43)
LYMPHOCYTES # BLD AUTO: 1.43 X10(3)/MCL (ref 0.6–4.6)
LYMPHOCYTES NFR BLD AUTO: 13.2 % (ref 16–44)
MCH RBC QN AUTO: 25.8 PG (ref 27–31)
MCHC RBC AUTO-ENTMCNC: 29.7 GM/DL (ref 33–36)
MCV RBC AUTO: 86.9 FL (ref 80–94)
MONOCYTES # BLD AUTO: 0.72 X10(3)/MCL (ref 0.1–1.3)
MONOCYTES NFR BLD AUTO: 6.7 % (ref 4–12.1)
NEUTROPHILS # BLD AUTO: 8.33 X10(3)/MCL (ref 2.1–9.2)
NEUTROPHILS NFR BLD AUTO: 77.1 % (ref 43–73)
NRBC BLD AUTO-RTO: 0.2 % (ref 0–0.2)
PCO2 BLDA: 53 MMHG (ref 35–45)
PH SMN: 7.5 [PH] (ref 7.35–7.45)
PLATELET # BLD AUTO: 610 X10(3)/MCL (ref 130–400)
PLATELET # BLD EST: ADEQUATE 10*3/UL
PMV BLD AUTO: 8.4 FL (ref 7.4–10.4)
PO2 BLDA: 76 MMHG (ref 50–100)
POC BE: 15.7 (ref -2–3)
POC SATURATED O2: 96 % (ref 96–97)
POTASSIUM SERPL-SCNC: 3.6 MMOL/L (ref 3.5–5.1)
PROT SERPL-MCNC: 6.9 GM/DL (ref 6.4–8.2)
RBC # BLD AUTO: 3.14 X10(6)/MCL (ref 4.2–5.4)
SETTING 1 ABG: ABNORMAL
SODIUM SERPL-SCNC: 144 MMOL/L (ref 136–145)
WBC # SPEC AUTO: 10.8 X10(3)/MCL (ref 4.5–11.5)

## 2019-08-01 LAB
ABS NEUT (OLG): 7.76 X10(3)/MCL (ref 2.1–9.2)
ANISOCYTOSIS BLD QL SMEAR: ABNORMAL
BUN SERPL-MCNC: 31 MG/DL (ref 7–18)
CALCIUM SERPL-MCNC: 8.9 MG/DL (ref 8.5–10.1)
CHLORIDE SERPL-SCNC: 104 MMOL/L (ref 98–107)
CO2 SERPL-SCNC: 36 MMOL/L (ref 21–32)
CREAT SERPL-MCNC: 0.62 MG/DL (ref 0.55–1.02)
CREAT/UREA NIT SERPL: 50
ERYTHROCYTE [DISTWIDTH] IN BLOOD BY AUTOMATED COUNT: 16.9 % (ref 11.5–17)
GLUCOSE SERPL-MCNC: 260 MG/DL (ref 74–106)
HCT VFR BLD AUTO: 26.5 % (ref 37–47)
HGB BLD-MCNC: 7.8 GM/DL (ref 12–16)
HYPOCHROMIA BLD QL SMEAR: ABNORMAL
MCH RBC QN AUTO: 25.6 PG (ref 27–31)
MCHC RBC AUTO-ENTMCNC: 29.4 GM/DL (ref 33–36)
MCV RBC AUTO: 86.9 FL (ref 80–94)
PLATELET # BLD AUTO: 582 X10(3)/MCL (ref 130–400)
PLATELET # BLD EST: ABNORMAL 10*3/UL
PMV BLD AUTO: 8.6 FL (ref 7.4–10.4)
POTASSIUM SERPL-SCNC: 3.5 MMOL/L (ref 3.5–5.1)
RBC # BLD AUTO: 3.05 X10(6)/MCL (ref 4.2–5.4)
RBC MORPH BLD: ABNORMAL
RBC MORPH BLD: ABNORMAL
SODIUM SERPL-SCNC: 144 MMOL/L (ref 136–145)
WBC # SPEC AUTO: 10.1 X10(3)/MCL (ref 4.5–11.5)

## 2019-08-02 LAB
ALBUMIN SERPL-MCNC: 2.2 GM/DL (ref 3.4–5)
BUN SERPL-MCNC: 37 MG/DL (ref 7–18)
CALCIUM SERPL-MCNC: 9 MG/DL (ref 8.5–10.1)
CHLORIDE SERPL-SCNC: 107 MMOL/L (ref 98–107)
CO2 SERPL-SCNC: 35 MMOL/L (ref 21–32)
CREAT SERPL-MCNC: 0.69 MG/DL (ref 0.55–1.02)
GLUCOSE SERPL-MCNC: 217 MG/DL (ref 74–106)
MAGNESIUM SERPL-MCNC: 2.1 MG/DL (ref 1.8–2.4)
PHOSPHATE SERPL-MCNC: 3.7 MG/DL (ref 2.5–4.9)
POTASSIUM SERPL-SCNC: 3.6 MMOL/L (ref 3.5–5.1)
SODIUM SERPL-SCNC: 146 MMOL/L (ref 136–145)

## 2019-08-03 LAB
ABS NEUT (OLG): 8.19 X10(3)/MCL (ref 2.1–9.2)
ALBUMIN SERPL-MCNC: 2.2 GM/DL (ref 3.4–5)
BUN SERPL-MCNC: 33 MG/DL (ref 7–18)
CALCIUM SERPL-MCNC: 8.9 MG/DL (ref 8.5–10.1)
CHLORIDE SERPL-SCNC: 109 MMOL/L (ref 98–107)
CO2 SERPL-SCNC: 35 MMOL/L (ref 21–32)
CREAT SERPL-MCNC: 0.62 MG/DL (ref 0.55–1.02)
CROSSMATCH INTERPRETATION: NORMAL
ERYTHROCYTE [DISTWIDTH] IN BLOOD BY AUTOMATED COUNT: 17.2 % (ref 11.5–17)
GLUCOSE SERPL-MCNC: 240 MG/DL (ref 74–106)
GROUP & RH: NORMAL
HCT VFR BLD AUTO: 26.5 % (ref 37–47)
HGB BLD-MCNC: 7.9 GM/DL (ref 12–16)
MAGNESIUM SERPL-MCNC: 2.2 MG/DL (ref 1.8–2.4)
MCH RBC QN AUTO: 26.2 PG (ref 27–31)
MCHC RBC AUTO-ENTMCNC: 29.8 GM/DL (ref 33–36)
MCV RBC AUTO: 88 FL (ref 80–94)
PHOSPHATE SERPL-MCNC: 3.3 MG/DL (ref 2.5–4.9)
PLATELET # BLD AUTO: 644 X10(3)/MCL (ref 130–400)
PMV BLD AUTO: 8.8 FL (ref 7.4–10.4)
POTASSIUM SERPL-SCNC: 4 MMOL/L (ref 3.5–5.1)
PRODUCT READY: NORMAL
PRODUCT READY: NORMAL
RBC # BLD AUTO: 3.01 X10(6)/MCL (ref 4.2–5.4)
SODIUM SERPL-SCNC: 147 MMOL/L (ref 136–145)
TRANSFUSION ORDER: NORMAL
WBC # SPEC AUTO: 10.5 X10(3)/MCL (ref 4.5–11.5)

## 2019-08-04 LAB
ABS NEUT (OLG): 8.68 X10(3)/MCL (ref 2.1–9.2)
ALBUMIN SERPL-MCNC: 2.3 GM/DL (ref 3.4–5)
BUN SERPL-MCNC: 35 MG/DL (ref 7–18)
CALCIUM SERPL-MCNC: 9.1 MG/DL (ref 8.5–10.1)
CHLORIDE SERPL-SCNC: 109 MMOL/L (ref 98–107)
CO2 SERPL-SCNC: 33 MMOL/L (ref 21–32)
CREAT SERPL-MCNC: 0.66 MG/DL (ref 0.55–1.02)
ERYTHROCYTE [DISTWIDTH] IN BLOOD BY AUTOMATED COUNT: 16.6 % (ref 11.5–17)
GLUCOSE SERPL-MCNC: 254 MG/DL (ref 74–106)
HCT VFR BLD AUTO: 32.9 % (ref 37–47)
HGB BLD-MCNC: 10.3 GM/DL (ref 12–16)
MAGNESIUM SERPL-MCNC: 2.4 MG/DL (ref 1.8–2.4)
MCH RBC QN AUTO: 27.3 PG (ref 27–31)
MCHC RBC AUTO-ENTMCNC: 31.3 GM/DL (ref 33–36)
MCV RBC AUTO: 87.3 FL (ref 80–94)
PHOSPHATE SERPL-MCNC: 3.6 MG/DL (ref 2.5–4.9)
PLATELET # BLD AUTO: 623 X10(3)/MCL (ref 130–400)
PMV BLD AUTO: 8.8 FL (ref 7.4–10.4)
POTASSIUM SERPL-SCNC: 3.9 MMOL/L (ref 3.5–5.1)
RBC # BLD AUTO: 3.77 X10(6)/MCL (ref 4.2–5.4)
SODIUM SERPL-SCNC: 145 MMOL/L (ref 136–145)
WBC # SPEC AUTO: 11.4 X10(3)/MCL (ref 4.5–11.5)

## 2019-08-05 LAB
ABS NEUT (OLG): 9.4 X10(3)/MCL (ref 2.1–9.2)
ALBUMIN SERPL-MCNC: 2.4 GM/DL (ref 3.4–5)
BUN SERPL-MCNC: 39 MG/DL (ref 7–18)
CALCIUM SERPL-MCNC: 9.1 MG/DL (ref 8.5–10.1)
CHLORIDE SERPL-SCNC: 109 MMOL/L (ref 98–107)
CO2 SERPL-SCNC: 34 MMOL/L (ref 21–32)
CREAT SERPL-MCNC: 0.69 MG/DL (ref 0.55–1.02)
ERYTHROCYTE [DISTWIDTH] IN BLOOD BY AUTOMATED COUNT: 16.4 % (ref 11.5–17)
GLUCOSE SERPL-MCNC: 258 MG/DL (ref 74–106)
HCT VFR BLD AUTO: 36.6 % (ref 37–47)
HGB BLD-MCNC: 10.7 GM/DL (ref 12–16)
MAGNESIUM SERPL-MCNC: 2.4 MG/DL (ref 1.8–2.4)
MCH RBC QN AUTO: 26.5 PG (ref 27–31)
MCHC RBC AUTO-ENTMCNC: 29.2 GM/DL (ref 33–36)
MCV RBC AUTO: 90.6 FL (ref 80–94)
PHOSPHATE SERPL-MCNC: 4.1 MG/DL (ref 2.5–4.9)
PLATELET # BLD AUTO: 632 X10(3)/MCL (ref 130–400)
PMV BLD AUTO: 8.9 FL (ref 7.4–10.4)
POTASSIUM SERPL-SCNC: 3.9 MMOL/L (ref 3.5–5.1)
RBC # BLD AUTO: 4.04 X10(6)/MCL (ref 4.2–5.4)
SODIUM SERPL-SCNC: 146 MMOL/L (ref 136–145)
WBC # SPEC AUTO: 12.1 X10(3)/MCL (ref 4.5–11.5)

## 2019-08-07 LAB
ABS NEUT (OLG): 7.97 X10(3)/MCL (ref 2.1–9.2)
ALBUMIN SERPL-MCNC: 2.6 GM/DL (ref 3.4–5)
ANISOCYTOSIS BLD QL SMEAR: SLIGHT
BUN SERPL-MCNC: 49 MG/DL (ref 7–18)
CALCIUM SERPL-MCNC: 9.2 MG/DL (ref 8.5–10.1)
CHLORIDE SERPL-SCNC: 109 MMOL/L (ref 98–107)
CO2 SERPL-SCNC: 34 MMOL/L (ref 21–32)
CREAT SERPL-MCNC: 0.68 MG/DL (ref 0.55–1.02)
ERYTHROCYTE [DISTWIDTH] IN BLOOD BY AUTOMATED COUNT: 16.4 % (ref 11.5–17)
GLUCOSE SERPL-MCNC: 276 MG/DL (ref 74–106)
HCT VFR BLD AUTO: 34.1 % (ref 37–47)
HGB BLD-MCNC: 10.2 GM/DL (ref 12–16)
HYPOCHROMIA BLD QL SMEAR: ABNORMAL
MACROCYTES BLD QL SMEAR: SLIGHT
MAGNESIUM SERPL-MCNC: 2.3 MG/DL (ref 1.8–2.4)
MCH RBC QN AUTO: 26.9 PG (ref 27–31)
MCHC RBC AUTO-ENTMCNC: 29.9 GM/DL (ref 33–36)
MCV RBC AUTO: 90 FL (ref 80–94)
PHOSPHATE SERPL-MCNC: 3.3 MG/DL (ref 2.5–4.9)
PLATELET # BLD AUTO: 597 X10(3)/MCL (ref 130–400)
PLATELET # BLD EST: ABNORMAL 10*3/UL
PMV BLD AUTO: 8.9 FL (ref 7.4–10.4)
POTASSIUM SERPL-SCNC: 3.7 MMOL/L (ref 3.5–5.1)
RBC # BLD AUTO: 3.79 X10(6)/MCL (ref 4.2–5.4)
RBC MORPH BLD: ABNORMAL
SODIUM SERPL-SCNC: 148 MMOL/L (ref 136–145)
WBC # SPEC AUTO: 11.2 X10(3)/MCL (ref 4.5–11.5)

## 2019-08-08 LAB
BUN SERPL-MCNC: 51 MG/DL (ref 7–18)
CALCIUM SERPL-MCNC: 9.3 MG/DL (ref 8.5–10.1)
CHLORIDE SERPL-SCNC: 111 MMOL/L (ref 98–107)
CO2 SERPL-SCNC: 32 MMOL/L (ref 21–32)
CREAT SERPL-MCNC: 0.73 MG/DL (ref 0.55–1.02)
CREAT/UREA NIT SERPL: 70
GLUCOSE SERPL-MCNC: 279 MG/DL (ref 74–106)
POTASSIUM SERPL-SCNC: 3.6 MMOL/L (ref 3.5–5.1)
SODIUM SERPL-SCNC: 148 MMOL/L (ref 136–145)

## 2019-08-09 LAB
ABS NEUT (OLG): 6.25 X10(3)/MCL (ref 2.1–9.2)
BASOPHILS # BLD AUTO: 0.06 X10(3)/MCL (ref 0–0.2)
BASOPHILS NFR BLD AUTO: 0.6 % (ref 0–1)
BUN SERPL-MCNC: 47 MG/DL (ref 7–18)
CALCIUM SERPL-MCNC: 9.6 MG/DL (ref 8.5–10.1)
CHLORIDE SERPL-SCNC: 111 MMOL/L (ref 98–107)
CO2 SERPL-SCNC: 33 MMOL/L (ref 21–32)
CREAT SERPL-MCNC: 0.73 MG/DL (ref 0.55–1.02)
CREAT/UREA NIT SERPL: 64
EOSINOPHIL # BLD AUTO: 0.38 X10(3)/MCL (ref 0–0.9)
EOSINOPHIL NFR BLD AUTO: 3.8 % (ref 0–6.4)
ERYTHROCYTE [DISTWIDTH] IN BLOOD BY AUTOMATED COUNT: 16.4 % (ref 11.5–17)
GLUCOSE SERPL-MCNC: 234 MG/DL (ref 74–106)
HCT VFR BLD AUTO: 35.9 % (ref 37–47)
HGB BLD-MCNC: 10.5 GM/DL (ref 12–16)
HYPOCHROMIA BLD QL SMEAR: NORMAL
IMM GRANULOCYTES # BLD AUTO: 0.04 10*3/UL (ref 0–0.02)
IMM GRANULOCYTES NFR BLD AUTO: 0.4 % (ref 0–0.43)
LYMPHOCYTES # BLD AUTO: 2.66 X10(3)/MCL (ref 0.6–4.6)
LYMPHOCYTES NFR BLD AUTO: 26.8 % (ref 16–44)
MCH RBC QN AUTO: 26.4 PG (ref 27–31)
MCHC RBC AUTO-ENTMCNC: 29.2 GM/DL (ref 33–36)
MCV RBC AUTO: 90.4 FL (ref 80–94)
MONOCYTES # BLD AUTO: 0.55 X10(3)/MCL (ref 0.1–1.3)
MONOCYTES NFR BLD AUTO: 5.5 % (ref 4–12.1)
NEUTROPHILS # BLD AUTO: 6.25 X10(3)/MCL (ref 2.1–9.2)
NEUTROPHILS NFR BLD AUTO: 62.9 % (ref 43–73)
NRBC BLD AUTO-RTO: 0 % (ref 0–0.2)
PLATELET # BLD AUTO: 530 X10(3)/MCL (ref 130–400)
PLATELET # BLD EST: ADEQUATE 10*3/UL
PMV BLD AUTO: 9.2 FL (ref 7.4–10.4)
POTASSIUM SERPL-SCNC: 3.9 MMOL/L (ref 3.5–5.1)
RBC # BLD AUTO: 3.97 X10(6)/MCL (ref 4.2–5.4)
SODIUM SERPL-SCNC: 147 MMOL/L (ref 136–145)
WBC # SPEC AUTO: 9.9 X10(3)/MCL (ref 4.5–11.5)

## 2019-08-12 LAB
ABS NEUT (OLG): 6.14 X10(3)/MCL (ref 2.1–9.2)
BASOPHILS # BLD AUTO: 0.05 X10(3)/MCL (ref 0–0.2)
BASOPHILS NFR BLD AUTO: 0.5 % (ref 0–1)
BUN SERPL-MCNC: 41 MG/DL (ref 7–18)
CALCIUM SERPL-MCNC: 9.5 MG/DL (ref 8.5–10.1)
CHLORIDE SERPL-SCNC: 110 MMOL/L (ref 98–107)
CO2 SERPL-SCNC: 33 MMOL/L (ref 21–32)
CREAT SERPL-MCNC: 0.65 MG/DL (ref 0.55–1.02)
CREAT/UREA NIT SERPL: 63
EOSINOPHIL # BLD AUTO: 0.37 X10(3)/MCL (ref 0–0.9)
EOSINOPHIL NFR BLD AUTO: 4 % (ref 0–6.4)
ERYTHROCYTE [DISTWIDTH] IN BLOOD BY AUTOMATED COUNT: 16.1 % (ref 11.5–17)
GLUCOSE SERPL-MCNC: 198 MG/DL (ref 74–106)
HCT VFR BLD AUTO: 34.9 % (ref 37–47)
HGB BLD-MCNC: 10.3 GM/DL (ref 12–16)
HYPOCHROMIA BLD QL SMEAR: NORMAL
IMM GRANULOCYTES # BLD AUTO: 0.02 10*3/UL (ref 0–0.02)
IMM GRANULOCYTES NFR BLD AUTO: 0.2 % (ref 0–0.43)
LYMPHOCYTES # BLD AUTO: 2.23 X10(3)/MCL (ref 0.6–4.6)
LYMPHOCYTES NFR BLD AUTO: 23.9 % (ref 16–44)
MCH RBC QN AUTO: 27 PG (ref 27–31)
MCHC RBC AUTO-ENTMCNC: 29.5 GM/DL (ref 33–36)
MCV RBC AUTO: 91.6 FL (ref 80–94)
MONOCYTES # BLD AUTO: 0.54 X10(3)/MCL (ref 0.1–1.3)
MONOCYTES NFR BLD AUTO: 5.8 % (ref 4–12.1)
NEUTROPHILS # BLD AUTO: 6.14 X10(3)/MCL (ref 2.1–9.2)
NEUTROPHILS NFR BLD AUTO: 65.6 % (ref 43–73)
NRBC BLD AUTO-RTO: 0 % (ref 0–0.2)
PLATELET # BLD AUTO: 438 X10(3)/MCL (ref 130–400)
PLATELET # BLD EST: ADEQUATE 10*3/UL
PMV BLD AUTO: 9.4 FL (ref 7.4–10.4)
POTASSIUM SERPL-SCNC: 4.1 MMOL/L (ref 3.5–5.1)
RBC # BLD AUTO: 3.81 X10(6)/MCL (ref 4.2–5.4)
SODIUM SERPL-SCNC: 146 MMOL/L (ref 136–145)
WBC # SPEC AUTO: 9.4 X10(3)/MCL (ref 4.5–11.5)

## 2019-08-13 LAB
ABS NEUT (OLG): 6.57 X10(3)/MCL (ref 2.1–9.2)
BASOPHILS # BLD AUTO: 0.05 X10(3)/MCL (ref 0–0.2)
BASOPHILS NFR BLD AUTO: 0.5 % (ref 0–1)
BUN SERPL-MCNC: 36 MG/DL (ref 7–18)
CALCIUM SERPL-MCNC: 9.2 MG/DL (ref 8.5–10.1)
CHLORIDE SERPL-SCNC: 107 MMOL/L (ref 98–107)
CO2 SERPL-SCNC: 32 MMOL/L (ref 21–32)
CREAT SERPL-MCNC: 0.64 MG/DL (ref 0.55–1.02)
CREAT/UREA NIT SERPL: 56
EOSINOPHIL # BLD AUTO: 0.33 X10(3)/MCL (ref 0–0.9)
EOSINOPHIL NFR BLD AUTO: 3.3 % (ref 0–6.4)
ERYTHROCYTE [DISTWIDTH] IN BLOOD BY AUTOMATED COUNT: 15.9 % (ref 11.5–17)
GLUCOSE SERPL-MCNC: 182 MG/DL (ref 74–106)
HCT VFR BLD AUTO: 34.1 % (ref 37–47)
HGB BLD-MCNC: 10.3 GM/DL (ref 12–16)
IMM GRANULOCYTES # BLD AUTO: 0.02 10*3/UL (ref 0–0.02)
IMM GRANULOCYTES NFR BLD AUTO: 0.2 % (ref 0–0.43)
LYMPHOCYTES # BLD AUTO: 2.45 X10(3)/MCL (ref 0.6–4.6)
LYMPHOCYTES NFR BLD AUTO: 24.7 % (ref 16–44)
MCH RBC QN AUTO: 27.2 PG (ref 27–31)
MCHC RBC AUTO-ENTMCNC: 30.2 GM/DL (ref 33–36)
MCV RBC AUTO: 90 FL (ref 80–94)
MONOCYTES # BLD AUTO: 0.51 X10(3)/MCL (ref 0.1–1.3)
MONOCYTES NFR BLD AUTO: 5.1 % (ref 4–12.1)
NEUTROPHILS # BLD AUTO: 6.57 X10(3)/MCL (ref 2.1–9.2)
NEUTROPHILS NFR BLD AUTO: 66.2 % (ref 43–73)
NRBC BLD AUTO-RTO: 0 % (ref 0–0.2)
PLATELET # BLD AUTO: 462 X10(3)/MCL (ref 130–400)
PMV BLD AUTO: 9.7 FL (ref 7.4–10.4)
POTASSIUM SERPL-SCNC: 4 MMOL/L (ref 3.5–5.1)
RBC # BLD AUTO: 3.79 X10(6)/MCL (ref 4.2–5.4)
SODIUM SERPL-SCNC: 142 MMOL/L (ref 136–145)
TROPONIN I SERPL-MCNC: <0.015 NG/ML (ref 0–0.04)
TROPONIN I SERPL-MCNC: <0.015 NG/ML (ref 0–0.04)
WBC # SPEC AUTO: 9.9 X10(3)/MCL (ref 4.5–11.5)

## 2019-08-14 LAB
ABS NEUT (OLG): 5.8 X10(3)/MCL (ref 2.1–9.2)
ANISOCYTOSIS BLD QL SMEAR: ABNORMAL
BASOPHILS # BLD AUTO: 0.05 X10(3)/MCL (ref 0–0.2)
BASOPHILS NFR BLD AUTO: 0.6 % (ref 0–1)
BUN SERPL-MCNC: 31 MG/DL (ref 7–18)
CALCIUM SERPL-MCNC: 8.8 MG/DL (ref 8.5–10.1)
CHLORIDE SERPL-SCNC: 107 MMOL/L (ref 98–107)
CO2 SERPL-SCNC: 30 MMOL/L (ref 21–32)
CREAT SERPL-MCNC: 0.63 MG/DL (ref 0.55–1.02)
CREAT/UREA NIT SERPL: 49
EOSINOPHIL # BLD AUTO: 0.28 X10(3)/MCL (ref 0–0.9)
EOSINOPHIL NFR BLD AUTO: 3.2 % (ref 0–6.4)
ERYTHROCYTE [DISTWIDTH] IN BLOOD BY AUTOMATED COUNT: 16.3 % (ref 11.5–17)
GLUCOSE SERPL-MCNC: 163 MG/DL (ref 74–106)
HCT VFR BLD AUTO: 31.1 % (ref 37–47)
HGB BLD-MCNC: 9.3 GM/DL (ref 12–16)
HYPOCHROMIA BLD QL SMEAR: ABNORMAL
IMM GRANULOCYTES # BLD AUTO: 0.03 10*3/UL (ref 0–0.02)
IMM GRANULOCYTES NFR BLD AUTO: 0.3 % (ref 0–0.43)
LYMPHOCYTES # BLD AUTO: 2.26 X10(3)/MCL (ref 0.6–4.6)
LYMPHOCYTES NFR BLD AUTO: 25.5 % (ref 16–44)
MCH RBC QN AUTO: 27 PG (ref 27–31)
MCHC RBC AUTO-ENTMCNC: 29.9 GM/DL (ref 33–36)
MCV RBC AUTO: 90.1 FL (ref 80–94)
MONOCYTES # BLD AUTO: 0.44 X10(3)/MCL (ref 0.1–1.3)
MONOCYTES NFR BLD AUTO: 5 % (ref 4–12.1)
NEUTROPHILS # BLD AUTO: 5.8 X10(3)/MCL (ref 2.1–9.2)
NEUTROPHILS NFR BLD AUTO: 65.4 % (ref 43–73)
NRBC BLD AUTO-RTO: 0 % (ref 0–0.2)
OVALOCYTES BLD QL SMEAR: SLIGHT
PLATELET # BLD AUTO: 399 X10(3)/MCL (ref 130–400)
PLATELET # BLD EST: ADEQUATE 10*3/UL
PMV BLD AUTO: 9.6 FL (ref 7.4–10.4)
POTASSIUM SERPL-SCNC: 3.7 MMOL/L (ref 3.5–5.1)
RBC # BLD AUTO: 3.45 X10(6)/MCL (ref 4.2–5.4)
RBC MORPH BLD: ABNORMAL
SODIUM SERPL-SCNC: 142 MMOL/L (ref 136–145)
TROPONIN I SERPL-MCNC: <0.015 NG/ML (ref 0–0.04)
WBC # SPEC AUTO: 8.9 X10(3)/MCL (ref 4.5–11.5)

## 2019-08-19 LAB
ABS NEUT (OLG): 5.81 X10(3)/MCL (ref 2.1–9.2)
BUN SERPL-MCNC: 22 MG/DL (ref 7–18)
CALCIUM SERPL-MCNC: 9.3 MG/DL (ref 8.5–10.1)
CHLORIDE SERPL-SCNC: 99 MMOL/L (ref 98–107)
CO2 SERPL-SCNC: 31 MMOL/L (ref 21–32)
CREAT SERPL-MCNC: 0.59 MG/DL (ref 0.55–1.02)
CREAT/UREA NIT SERPL: 37
ERYTHROCYTE [DISTWIDTH] IN BLOOD BY AUTOMATED COUNT: 16.3 % (ref 11.5–17)
GLUCOSE SERPL-MCNC: 156 MG/DL (ref 74–106)
HCT VFR BLD AUTO: 33.4 % (ref 37–47)
HGB BLD-MCNC: 10.4 GM/DL (ref 12–16)
MAGNESIUM SERPL-MCNC: 2.1 MG/DL (ref 1.8–2.4)
MCH RBC QN AUTO: 27.5 PG (ref 27–31)
MCHC RBC AUTO-ENTMCNC: 31.1 GM/DL (ref 33–36)
MCV RBC AUTO: 88.4 FL (ref 80–94)
NRBC BLD AUTO-RTO: 0 % (ref 0–0.2)
PLATELET # BLD AUTO: 430 X10(3)/MCL (ref 130–400)
PMV BLD AUTO: 9.2 FL (ref 7.4–10.4)
POTASSIUM SERPL-SCNC: 4.1 MMOL/L (ref 3.5–5.1)
RBC # BLD AUTO: 3.78 X10(6)/MCL (ref 4.2–5.4)
SODIUM SERPL-SCNC: 135 MMOL/L (ref 136–145)
WBC # SPEC AUTO: 9 X10(3)/MCL (ref 4.5–11.5)

## 2019-08-23 LAB
ABS NEUT (OLG): 7.8 X10(3)/MCL (ref 2.1–9.2)
BASOPHILS # BLD AUTO: 0.05 X10(3)/MCL (ref 0–0.2)
BASOPHILS NFR BLD AUTO: 0.4 % (ref 0–1)
BUN SERPL-MCNC: 20 MG/DL (ref 7–18)
CALCIUM SERPL-MCNC: 8.8 MG/DL (ref 8.5–10.1)
CHLORIDE SERPL-SCNC: 100 MMOL/L (ref 98–107)
CO2 SERPL-SCNC: 27 MMOL/L (ref 21–32)
CREAT SERPL-MCNC: 0.51 MG/DL (ref 0.55–1.02)
CREAT/UREA NIT SERPL: 39
EOSINOPHIL # BLD AUTO: 0.29 X10(3)/MCL (ref 0–0.9)
EOSINOPHIL NFR BLD AUTO: 2.6 % (ref 0–6.4)
ERYTHROCYTE [DISTWIDTH] IN BLOOD BY AUTOMATED COUNT: 17 % (ref 11.5–17)
FERRITIN SERPL-MCNC: 89.5 NG/ML (ref 8–388)
GLUCOSE SERPL-MCNC: 163 MG/DL (ref 74–106)
HCT VFR BLD AUTO: 28.9 % (ref 37–47)
HGB BLD-MCNC: 9.1 GM/DL (ref 12–16)
IMM GRANULOCYTES # BLD AUTO: 0.03 10*3/UL (ref 0–0.02)
IMM GRANULOCYTES NFR BLD AUTO: 0.3 % (ref 0–0.43)
IRON SATN MFR SERPL: 18 % (ref 20–50)
IRON SERPL-MCNC: 57 MCG/DL (ref 50–175)
LYMPHOCYTES # BLD AUTO: 2.35 X10(3)/MCL (ref 0.6–4.6)
LYMPHOCYTES NFR BLD AUTO: 21 % (ref 16–44)
MCH RBC QN AUTO: 27.3 PG (ref 27–31)
MCHC RBC AUTO-ENTMCNC: 31.5 GM/DL (ref 33–36)
MCV RBC AUTO: 86.8 FL (ref 80–94)
MONOCYTES # BLD AUTO: 0.66 X10(3)/MCL (ref 0.1–1.3)
MONOCYTES NFR BLD AUTO: 5.9 % (ref 4–12.1)
NEUTROPHILS # BLD AUTO: 7.8 X10(3)/MCL (ref 2.1–9.2)
NEUTROPHILS NFR BLD AUTO: 69.8 % (ref 43–73)
NRBC BLD AUTO-RTO: 0 % (ref 0–0.2)
PLATELET # BLD AUTO: 403 X10(3)/MCL (ref 130–400)
PMV BLD AUTO: 8.7 FL (ref 7.4–10.4)
POTASSIUM SERPL-SCNC: 4.1 MMOL/L (ref 3.5–5.1)
RBC # BLD AUTO: 3.33 X10(6)/MCL (ref 4.2–5.4)
SODIUM SERPL-SCNC: 134 MMOL/L (ref 136–145)
TIBC SERPL-MCNC: 317 MCG/DL (ref 250–450)
TRANSFERRIN SERPL-MCNC: 260 MG/DL (ref 200–360)
WBC # SPEC AUTO: 11.2 X10(3)/MCL (ref 4.5–11.5)

## 2019-08-26 LAB
ABS NEUT (OLG): 8.51 X10(3)/MCL (ref 2.1–9.2)
ANISOCYTOSIS BLD QL SMEAR: ABNORMAL
BASOPHILS # BLD AUTO: 0.06 X10(3)/MCL (ref 0–0.2)
BASOPHILS NFR BLD AUTO: 0.5 % (ref 0–1)
BUN SERPL-MCNC: 21 MG/DL (ref 7–18)
CALCIUM SERPL-MCNC: 8.8 MG/DL (ref 8.5–10.1)
CHLORIDE SERPL-SCNC: 99 MMOL/L (ref 98–107)
CO2 SERPL-SCNC: 29 MMOL/L (ref 21–32)
CREAT SERPL-MCNC: 0.56 MG/DL (ref 0.55–1.02)
CREAT/UREA NIT SERPL: 38
EOSINOPHIL # BLD AUTO: 0.23 X10(3)/MCL (ref 0–0.9)
EOSINOPHIL NFR BLD AUTO: 2 % (ref 0–6.4)
ERYTHROCYTE [DISTWIDTH] IN BLOOD BY AUTOMATED COUNT: 17.7 % (ref 11.5–17)
GLUCOSE SERPL-MCNC: 206 MG/DL (ref 74–106)
HCT VFR BLD AUTO: 30.4 % (ref 37–47)
HGB BLD-MCNC: 9 GM/DL (ref 12–16)
HYPOCHROMIA BLD QL SMEAR: ABNORMAL
IMM GRANULOCYTES # BLD AUTO: 0.05 10*3/UL (ref 0–0.02)
IMM GRANULOCYTES NFR BLD AUTO: 0.4 % (ref 0–0.43)
LYMPHOCYTES # BLD AUTO: 2.19 X10(3)/MCL (ref 0.6–4.6)
LYMPHOCYTES NFR BLD AUTO: 18.8 % (ref 16–44)
MACROCYTES BLD QL SMEAR: SLIGHT
MCH RBC QN AUTO: 26.5 PG (ref 27–31)
MCHC RBC AUTO-ENTMCNC: 29.6 GM/DL (ref 33–36)
MCV RBC AUTO: 89.7 FL (ref 80–94)
MICROCYTES BLD QL SMEAR: SLIGHT
MONOCYTES # BLD AUTO: 0.61 X10(3)/MCL (ref 0.1–1.3)
MONOCYTES NFR BLD AUTO: 5.2 % (ref 4–12.1)
NEUTROPHILS # BLD AUTO: 8.51 X10(3)/MCL (ref 2.1–9.2)
NEUTROPHILS NFR BLD AUTO: 73.1 % (ref 43–73)
NRBC BLD AUTO-RTO: 0 % (ref 0–0.2)
PLATELET # BLD AUTO: 422 X10(3)/MCL (ref 130–400)
PLATELET # BLD EST: ADEQUATE 10*3/UL
PMV BLD AUTO: 8.8 FL (ref 7.4–10.4)
POLYCHROMASIA BLD QL SMEAR: SLIGHT
POTASSIUM SERPL-SCNC: 4 MMOL/L (ref 3.5–5.1)
RBC # BLD AUTO: 3.39 X10(6)/MCL (ref 4.2–5.4)
RBC MORPH BLD: ABNORMAL
SODIUM SERPL-SCNC: 133 MMOL/L (ref 136–145)
WBC # SPEC AUTO: 11.6 X10(3)/MCL (ref 4.5–11.5)

## 2019-08-29 LAB
ABS NEUT (OLG): 7.33 X10(3)/MCL (ref 2.1–9.2)
APTT PPP: 30 SEC (ref 23.4–34.9)
BASOPHILS # BLD AUTO: 0.05 X10(3)/MCL (ref 0–0.2)
BASOPHILS NFR BLD AUTO: 0.5 % (ref 0–1)
BUN SERPL-MCNC: 22 MG/DL (ref 7–18)
CALCIUM SERPL-MCNC: 8.5 MG/DL (ref 8.5–10.1)
CHLORIDE SERPL-SCNC: 101 MMOL/L (ref 98–107)
CO2 SERPL-SCNC: 31 MMOL/L (ref 21–32)
CREAT SERPL-MCNC: 0.56 MG/DL (ref 0.55–1.02)
CREAT/UREA NIT SERPL: 39
EOSINOPHIL # BLD AUTO: 0.45 X10(3)/MCL (ref 0–0.9)
EOSINOPHIL NFR BLD AUTO: 4.3 % (ref 0–6.4)
ERYTHROCYTE [DISTWIDTH] IN BLOOD BY AUTOMATED COUNT: 17.4 % (ref 11.5–17)
GLUCOSE SERPL-MCNC: 229 MG/DL (ref 74–106)
HCT VFR BLD AUTO: 26.9 % (ref 37–47)
HGB BLD-MCNC: 8.2 GM/DL (ref 12–16)
IMM GRANULOCYTES # BLD AUTO: 0.04 10*3/UL (ref 0–0.02)
IMM GRANULOCYTES NFR BLD AUTO: 0.4 % (ref 0–0.43)
INR PPP: 1 (ref 2–3)
LYMPHOCYTES # BLD AUTO: 2.09 X10(3)/MCL (ref 0.6–4.6)
LYMPHOCYTES NFR BLD AUTO: 20 % (ref 16–44)
MAGNESIUM SERPL-MCNC: 2 MG/DL (ref 1.8–2.4)
MCH RBC QN AUTO: 27.5 PG (ref 27–31)
MCHC RBC AUTO-ENTMCNC: 30.5 GM/DL (ref 33–36)
MCV RBC AUTO: 90.3 FL (ref 80–94)
MONOCYTES # BLD AUTO: 0.5 X10(3)/MCL (ref 0.1–1.3)
MONOCYTES NFR BLD AUTO: 4.8 % (ref 4–12.1)
NEUTROPHILS # BLD AUTO: 7.33 X10(3)/MCL (ref 2.1–9.2)
NEUTROPHILS NFR BLD AUTO: 70 % (ref 43–73)
NRBC BLD AUTO-RTO: 0 % (ref 0–0.2)
PLATELET # BLD AUTO: 374 X10(3)/MCL (ref 130–400)
PMV BLD AUTO: 8.6 FL (ref 7.4–10.4)
POTASSIUM SERPL-SCNC: 4.9 MMOL/L (ref 3.5–5.1)
PROTHROMBIN TIME: 13.6 SEC (ref 11.7–14.5)
RBC # BLD AUTO: 2.98 X10(6)/MCL (ref 4.2–5.4)
SODIUM SERPL-SCNC: 136 MMOL/L (ref 136–145)
WBC # SPEC AUTO: 10.5 X10(3)/MCL (ref 4.5–11.5)

## 2019-09-01 LAB
ABS NEUT (OLG): 11.36 X10(3)/MCL (ref 2.1–9.2)
BUN SERPL-MCNC: 24 MG/DL (ref 7–18)
CALCIUM SERPL-MCNC: 9.1 MG/DL (ref 8.5–10.1)
CHLORIDE SERPL-SCNC: 92 MMOL/L (ref 98–107)
CO2 SERPL-SCNC: 27 MMOL/L (ref 21–32)
CREAT SERPL-MCNC: 0.62 MG/DL (ref 0.55–1.02)
CREAT/UREA NIT SERPL: 39
ERYTHROCYTE [DISTWIDTH] IN BLOOD BY AUTOMATED COUNT: 16.9 % (ref 11.5–17)
GLUCOSE SERPL-MCNC: 324 MG/DL (ref 74–106)
HCT VFR BLD AUTO: 29.6 % (ref 37–47)
HGB BLD-MCNC: 9.2 GM/DL (ref 12–16)
MCH RBC QN AUTO: 27.5 PG (ref 27–31)
MCHC RBC AUTO-ENTMCNC: 31.1 GM/DL (ref 33–36)
MCV RBC AUTO: 88.4 FL (ref 80–94)
NRBC BLD AUTO-RTO: 0 % (ref 0–0.2)
PLATELET # BLD AUTO: 433 X10(3)/MCL (ref 130–400)
PMV BLD AUTO: 8.1 FL (ref 7.4–10.4)
POTASSIUM SERPL-SCNC: 4.3 MMOL/L (ref 3.5–5.1)
RBC # BLD AUTO: 3.35 X10(6)/MCL (ref 4.2–5.4)
SODIUM SERPL-SCNC: 127 MMOL/L (ref 136–145)
WBC # SPEC AUTO: 13.3 X10(3)/MCL (ref 4.5–11.5)

## 2019-09-02 LAB
ABS NEUT (OLG): 9.68 X10(3)/MCL (ref 2.1–9.2)
BUN SERPL-MCNC: 27 MG/DL (ref 7–18)
CALCIUM SERPL-MCNC: 8.4 MG/DL (ref 8.5–10.1)
CHLORIDE SERPL-SCNC: 90 MMOL/L (ref 98–107)
CO2 SERPL-SCNC: 24 MMOL/L (ref 21–32)
CREAT SERPL-MCNC: 0.68 MG/DL (ref 0.55–1.02)
CREAT/UREA NIT SERPL: 40
ERYTHROCYTE [DISTWIDTH] IN BLOOD BY AUTOMATED COUNT: 16.8 % (ref 11.5–17)
GLUCOSE SERPL-MCNC: 319 MG/DL (ref 74–106)
HCT VFR BLD AUTO: 25.9 % (ref 37–47)
HGB BLD-MCNC: 8.3 GM/DL (ref 12–16)
MCH RBC QN AUTO: 27.2 PG (ref 27–31)
MCHC RBC AUTO-ENTMCNC: 32 GM/DL (ref 33–36)
MCV RBC AUTO: 84.9 FL (ref 80–94)
NRBC BLD AUTO-RTO: 0 % (ref 0–0.2)
PLATELET # BLD AUTO: 394 X10(3)/MCL (ref 130–400)
PMV BLD AUTO: 8.5 FL (ref 7.4–10.4)
POTASSIUM SERPL-SCNC: 4.3 MMOL/L (ref 3.5–5.1)
RBC # BLD AUTO: 3.05 X10(6)/MCL (ref 4.2–5.4)
SODIUM SERPL-SCNC: 123 MMOL/L (ref 136–145)
WBC # SPEC AUTO: 11 X10(3)/MCL (ref 4.5–11.5)

## 2019-09-03 LAB
GRAM STN SPEC: NORMAL
T4 FREE SERPL-MCNC: 1.11 NG/DL (ref 0.76–1.46)
TSH SERPL-ACNC: 2.14 MIU/ML (ref 0.36–3.74)

## 2019-09-04 LAB
ABS NEUT (OLG): 6.38 X10(3)/MCL (ref 2.1–9.2)
ALBUMIN SERPL-MCNC: 2.1 GM/DL (ref 3.4–5)
BUN SERPL-MCNC: 38 MG/DL (ref 7–18)
CALCIUM SERPL-MCNC: 8.7 MG/DL (ref 8.5–10.1)
CHLORIDE SERPL-SCNC: 99 MMOL/L (ref 98–107)
CO2 SERPL-SCNC: 30 MMOL/L (ref 21–32)
CREAT SERPL-MCNC: 0.55 MG/DL (ref 0.55–1.02)
CROSSMATCH INTERPRETATION: NORMAL
ERYTHROCYTE [DISTWIDTH] IN BLOOD BY AUTOMATED COUNT: 16.6 % (ref 11.5–17)
GLUCOSE SERPL-MCNC: 143 MG/DL (ref 74–106)
GROUP & RH: NORMAL
HCT VFR BLD AUTO: 23 % (ref 37–47)
HGB BLD-MCNC: 7.1 GM/DL (ref 12–16)
MAGNESIUM SERPL-MCNC: 1.9 MG/DL (ref 1.8–2.4)
MCH RBC QN AUTO: 26.7 PG (ref 27–31)
MCHC RBC AUTO-ENTMCNC: 30.9 GM/DL (ref 33–36)
MCV RBC AUTO: 86.5 FL (ref 80–94)
NRBC BLD AUTO-RTO: 0 % (ref 0–0.2)
PHOSPHATE SERPL-MCNC: 4.2 MG/DL (ref 2.5–4.9)
PLATELET # BLD AUTO: 411 X10(3)/MCL (ref 130–400)
PMV BLD AUTO: 8.5 FL (ref 7.4–10.4)
POTASSIUM SERPL-SCNC: 4.2 MMOL/L (ref 3.5–5.1)
PRODUCT READY: NORMAL
RBC # BLD AUTO: 2.66 X10(6)/MCL (ref 4.2–5.4)
SODIUM SERPL-SCNC: 132 MMOL/L (ref 136–145)
TRANSFUSION ORDER: NORMAL
VANCOMYCIN TROUGH SERPL-MCNC: 10.1 MCG/ML (ref 12–20)
WBC # SPEC AUTO: 8.7 X10(3)/MCL (ref 4.5–11.5)

## 2019-09-05 LAB
ABS NEUT (OLG): 5.92 X10(3)/MCL (ref 2.1–9.2)
BASOPHILS # BLD AUTO: 0.04 X10(3)/MCL (ref 0–0.2)
BASOPHILS NFR BLD AUTO: 0.5 % (ref 0–1)
BUN SERPL-MCNC: 24 MG/DL (ref 7–18)
CALCIUM SERPL-MCNC: 8.8 MG/DL (ref 8.5–10.1)
CHLORIDE SERPL-SCNC: 99 MMOL/L (ref 98–107)
CO2 SERPL-SCNC: 29 MMOL/L (ref 21–32)
COLOR STL: ABNORMAL
CONSISTENCY STL: ABNORMAL
CREAT SERPL-MCNC: 0.46 MG/DL (ref 0.55–1.02)
CREAT/UREA NIT SERPL: 52
EOSINOPHIL # BLD AUTO: 0.19 X10(3)/MCL (ref 0–0.9)
EOSINOPHIL NFR BLD AUTO: 2.3 % (ref 0–6.4)
ERYTHROCYTE [DISTWIDTH] IN BLOOD BY AUTOMATED COUNT: 17 % (ref 11.5–17)
FINAL CULTURE: NORMAL
GLUCOSE SERPL-MCNC: 192 MG/DL (ref 74–106)
HCT VFR BLD AUTO: 30.9 % (ref 37–47)
HEMOCCULT SP1 STL QL: POSITIVE
HGB BLD-MCNC: 9.9 GM/DL (ref 12–16)
IMM GRANULOCYTES # BLD AUTO: 0.03 10*3/UL (ref 0–0.02)
IMM GRANULOCYTES NFR BLD AUTO: 0.4 % (ref 0–0.43)
LYMPHOCYTES # BLD AUTO: 1.65 X10(3)/MCL (ref 0.6–4.6)
LYMPHOCYTES NFR BLD AUTO: 19.8 % (ref 16–44)
MCH RBC QN AUTO: 27.2 PG (ref 27–31)
MCHC RBC AUTO-ENTMCNC: 32 GM/DL (ref 33–36)
MCV RBC AUTO: 84.9 FL (ref 80–94)
MONOCYTES # BLD AUTO: 0.52 X10(3)/MCL (ref 0.1–1.3)
MONOCYTES NFR BLD AUTO: 6.2 % (ref 4–12.1)
NEUTROPHILS # BLD AUTO: 5.92 X10(3)/MCL (ref 2.1–9.2)
NEUTROPHILS NFR BLD AUTO: 70.8 % (ref 43–73)
NRBC BLD AUTO-RTO: 0 % (ref 0–0.2)
PLATELET # BLD AUTO: 423 X10(3)/MCL (ref 130–400)
PMV BLD AUTO: 8.1 FL (ref 7.4–10.4)
POTASSIUM SERPL-SCNC: 4.2 MMOL/L (ref 3.5–5.1)
RBC # BLD AUTO: 3.64 X10(6)/MCL (ref 4.2–5.4)
SODIUM SERPL-SCNC: 134 MMOL/L (ref 136–145)
WBC # SPEC AUTO: 8.4 X10(3)/MCL (ref 4.5–11.5)

## 2019-09-06 LAB
COLOR STL: NORMAL
CONSISTENCY STL: NORMAL
HEMOCCULT SP2 STL QL: POSITIVE
VANCOMYCIN TROUGH SERPL-MCNC: 7.1 MCG/ML (ref 12–20)

## 2019-09-07 ENCOUNTER — HISTORICAL (OUTPATIENT)
Dept: ADMINISTRATIVE | Facility: HOSPITAL | Age: 59
End: 2019-09-07

## 2019-09-07 LAB
ABS NEUT (OLG): 13.3 X10(3)/MCL (ref 2.1–9.2)
BASOPHILS # BLD AUTO: 0.05 X10(3)/MCL (ref 0–0.2)
BASOPHILS NFR BLD AUTO: 0.3 % (ref 0–1)
BUN SERPL-MCNC: 18 MG/DL (ref 7–18)
CALCIUM SERPL-MCNC: 8.6 MG/DL (ref 8.5–10.1)
CHLORIDE SERPL-SCNC: 97 MMOL/L (ref 98–107)
CO2 SERPL-SCNC: 30 MMOL/L (ref 21–32)
CREAT SERPL-MCNC: 0.48 MG/DL (ref 0.55–1.02)
CREAT/UREA NIT SERPL: 38
EOSINOPHIL # BLD AUTO: 0.09 X10(3)/MCL (ref 0–0.9)
EOSINOPHIL NFR BLD AUTO: 0.6 % (ref 0–6.4)
ERYTHROCYTE [DISTWIDTH] IN BLOOD BY AUTOMATED COUNT: 16.3 % (ref 11.5–17)
GLUCOSE SERPL-MCNC: 183 MG/DL (ref 74–106)
HCO3 UR-SCNC: 29.5 MMOL/L (ref 22–26)
HCO3 UR-SCNC: 34.5 MMOL/L (ref 22–26)
HCT VFR BLD AUTO: 29.7 % (ref 37–47)
HGB BLD-MCNC: 9.5 GM/DL (ref 12–16)
IMM GRANULOCYTES # BLD AUTO: 0.1 10*3/UL (ref 0–0.02)
IMM GRANULOCYTES NFR BLD AUTO: 0.6 % (ref 0–0.43)
LYMPHOCYTES # BLD AUTO: 1.25 X10(3)/MCL (ref 0.6–4.6)
LYMPHOCYTES NFR BLD AUTO: 8.1 % (ref 16–44)
MCH RBC QN AUTO: 27.2 PG (ref 27–31)
MCHC RBC AUTO-ENTMCNC: 32 GM/DL (ref 33–36)
MCV RBC AUTO: 85.1 FL (ref 80–94)
MONOCYTES # BLD AUTO: 0.6 X10(3)/MCL (ref 0.1–1.3)
MONOCYTES NFR BLD AUTO: 3.9 % (ref 4–12.1)
NEUTROPHILS # BLD AUTO: 13.3 X10(3)/MCL (ref 2.1–9.2)
NEUTROPHILS NFR BLD AUTO: 86.5 % (ref 43–73)
NRBC BLD AUTO-RTO: 0 % (ref 0–0.2)
PCO2 BLDA: 52 MMHG (ref 35–45)
PCO2 BLDA: 72 MMHG (ref 35–45)
PH SMN: 7.22 [PH] (ref 7.35–7.45)
PH SMN: 7.43 [PH] (ref 7.35–7.45)
PLATELET # BLD AUTO: 544 X10(3)/MCL (ref 130–400)
PMV BLD AUTO: 8 FL (ref 7.4–10.4)
PO2 BLDA: 133 MMHG (ref 50–100)
PO2 BLDA: 133 MMHG (ref 50–100)
POC BE: 8.6 (ref -2–3)
POC SATURATED O2: 98 % (ref 96–97)
POC SATURATED O2: 99 % (ref 96–97)
POTASSIUM SERPL-SCNC: 4.1 MMOL/L (ref 3.5–5.1)
RBC # BLD AUTO: 3.49 X10(6)/MCL (ref 4.2–5.4)
SETTING 1 ABG: ABNORMAL
SODIUM SERPL-SCNC: 133 MMOL/L (ref 136–145)
WBC # SPEC AUTO: 15.4 X10(3)/MCL (ref 4.5–11.5)

## 2019-09-08 LAB
ABS NEUT (OLG): 6.95 X10(3)/MCL (ref 2.1–9.2)
BASOPHILS # BLD AUTO: 0.05 X10(3)/MCL (ref 0–0.2)
BASOPHILS NFR BLD AUTO: 0.5 % (ref 0–1)
BUN SERPL-MCNC: 20 MG/DL (ref 7–18)
CALCIUM SERPL-MCNC: 8.7 MG/DL (ref 8.5–10.1)
CHLORIDE SERPL-SCNC: 103 MMOL/L (ref 98–107)
CO2 SERPL-SCNC: 31 MMOL/L (ref 21–32)
CREAT SERPL-MCNC: 0.44 MG/DL (ref 0.55–1.02)
CREAT/UREA NIT SERPL: 45
EOSINOPHIL # BLD AUTO: 0.3 X10(3)/MCL (ref 0–0.9)
EOSINOPHIL NFR BLD AUTO: 3.1 % (ref 0–6.4)
ERYTHROCYTE [DISTWIDTH] IN BLOOD BY AUTOMATED COUNT: 16.4 % (ref 11.5–17)
GLUCOSE SERPL-MCNC: 147 MG/DL (ref 74–106)
HCT VFR BLD AUTO: 27.2 % (ref 37–47)
HGB BLD-MCNC: 8.6 GM/DL (ref 12–16)
IMM GRANULOCYTES # BLD AUTO: 0.04 10*3/UL (ref 0–0.02)
IMM GRANULOCYTES NFR BLD AUTO: 0.4 % (ref 0–0.43)
LYMPHOCYTES # BLD AUTO: 1.96 X10(3)/MCL (ref 0.6–4.6)
LYMPHOCYTES NFR BLD AUTO: 20.2 % (ref 16–44)
MCH RBC QN AUTO: 27.5 PG (ref 27–31)
MCHC RBC AUTO-ENTMCNC: 31.6 GM/DL (ref 33–36)
MCV RBC AUTO: 86.9 FL (ref 80–94)
MONOCYTES # BLD AUTO: 0.42 X10(3)/MCL (ref 0.1–1.3)
MONOCYTES NFR BLD AUTO: 4.3 % (ref 4–12.1)
NEUTROPHILS # BLD AUTO: 6.95 X10(3)/MCL (ref 2.1–9.2)
NEUTROPHILS NFR BLD AUTO: 71.5 % (ref 43–73)
NRBC BLD AUTO-RTO: 0 % (ref 0–0.2)
PLATELET # BLD AUTO: 526 X10(3)/MCL (ref 130–400)
PMV BLD AUTO: 8.2 FL (ref 7.4–10.4)
POTASSIUM SERPL-SCNC: 4 MMOL/L (ref 3.5–5.1)
RBC # BLD AUTO: 3.13 X10(6)/MCL (ref 4.2–5.4)
SODIUM SERPL-SCNC: 139 MMOL/L (ref 136–145)
VANCOMYCIN TROUGH SERPL-MCNC: 8.2 MCG/ML (ref 12–20)
WBC # SPEC AUTO: 9.7 X10(3)/MCL (ref 4.5–11.5)

## 2019-09-09 LAB
BUN SERPL-MCNC: 18 MG/DL (ref 7–18)
CALCIUM SERPL-MCNC: 8.9 MG/DL (ref 8.5–10.1)
CHLORIDE SERPL-SCNC: 98 MMOL/L (ref 98–107)
CO2 SERPL-SCNC: 32 MMOL/L (ref 21–32)
CREAT SERPL-MCNC: 0.4 MG/DL (ref 0.55–1.02)
CREAT/UREA NIT SERPL: 45
GLUCOSE SERPL-MCNC: 148 MG/DL (ref 74–106)
POTASSIUM SERPL-SCNC: 4 MMOL/L (ref 3.5–5.1)
SODIUM SERPL-SCNC: 135 MMOL/L (ref 136–145)
VANCOMYCIN TROUGH SERPL-MCNC: 14.5 MCG/ML (ref 12–20)

## 2019-09-10 LAB
FINAL CULTURE: NORMAL
HCO3 UR-SCNC: 30.3 MMOL/L (ref 22–26)
PCO2 BLDA: 69 MMHG (ref 35–45)
PH SMN: 7.25 [PH] (ref 7.35–7.45)
PO2 BLDA: 47 MMHG (ref 50–100)
POC BE: 1.4 (ref -2–3)
POC SATURATED O2: 75 % (ref 96–97)
SETTING 1 ABG: ABNORMAL

## 2019-09-11 LAB
HCO3 UR-SCNC: 35.3 MMOL/L (ref 22–26)
PCO2 BLDA: 57 MMHG (ref 35–45)
PH SMN: 7.4 [PH] (ref 7.35–7.45)
PO2 BLDA: 339 MMHG (ref 50–100)
POC BE: 8.7 (ref -2–3)
POC SATURATED O2: 100 % (ref 96–97)
SETTING 1 ABG: ABNORMAL
SETTING 2 ABG: ABNORMAL
VANCOMYCIN TROUGH SERPL-MCNC: 11.7 MCG/ML (ref 12–20)

## 2019-09-12 LAB
ABS NEUT (OLG): 6.66 X10(3)/MCL (ref 2.1–9.2)
BUN SERPL-MCNC: 19 MG/DL (ref 7–18)
CALCIUM SERPL-MCNC: 8.4 MG/DL (ref 8.5–10.1)
CHLORIDE SERPL-SCNC: 101 MMOL/L (ref 98–107)
CO2 SERPL-SCNC: 30 MMOL/L (ref 21–32)
CREAT SERPL-MCNC: 0.46 MG/DL (ref 0.55–1.02)
CREAT/UREA NIT SERPL: 41
ERYTHROCYTE [DISTWIDTH] IN BLOOD BY AUTOMATED COUNT: 16 % (ref 11.5–17)
FINAL CULTURE: NORMAL
FINAL CULTURE: NORMAL
GLUCOSE SERPL-MCNC: 151 MG/DL (ref 74–106)
HCO3 UR-SCNC: 33.1 MMOL/L (ref 22–26)
HCT VFR BLD AUTO: 30.9 % (ref 37–47)
HGB BLD-MCNC: 9.6 GM/DL (ref 12–16)
MCH RBC QN AUTO: 27.6 PG (ref 27–31)
MCHC RBC AUTO-ENTMCNC: 31.1 GM/DL (ref 33–36)
MCV RBC AUTO: 88.8 FL (ref 80–94)
NRBC BLD AUTO-RTO: 0 % (ref 0–0.2)
PCO2 BLDA: 60 MMHG (ref 35–45)
PH SMN: 7.35 [PH] (ref 7.35–7.45)
PLATELET # BLD AUTO: 553 X10(3)/MCL (ref 130–400)
PMV BLD AUTO: 8 FL (ref 7.4–10.4)
PO2 BLDA: 58 MMHG (ref 50–100)
POC BE: 5.8 (ref -2–3)
POC SATURATED O2: 88 % (ref 96–97)
POTASSIUM SERPL-SCNC: 4.5 MMOL/L (ref 3.5–5.1)
RBC # BLD AUTO: 3.48 X10(6)/MCL (ref 4.2–5.4)
SETTING 1 ABG: ABNORMAL
SETTING 2 ABG: ABNORMAL
SODIUM SERPL-SCNC: 135 MMOL/L (ref 136–145)
VANCOMYCIN TROUGH SERPL-MCNC: 15.2 MCG/ML (ref 12–20)
WBC # SPEC AUTO: 9 X10(3)/MCL (ref 4.5–11.5)

## 2019-09-13 LAB
HCO3 UR-SCNC: 37 MMOL/L (ref 22–26)
PCO2 BLDA: 52 MMHG (ref 35–45)
PH SMN: 7.46 [PH] (ref 7.35–7.45)
PO2 BLDA: 94 MMHG (ref 50–100)
POC BE: 11.3 (ref -2–3)
POC SATURATED O2: 98 % (ref 96–97)
SETTING 1 ABG: ABNORMAL
SETTING 2 ABG: ABNORMAL

## 2019-09-14 LAB — VANCOMYCIN TROUGH SERPL-MCNC: 15.9 MCG/ML (ref 12–20)

## 2019-09-16 LAB
ABS NEUT (OLG): 6.28 X10(3)/MCL (ref 2.1–9.2)
BUN SERPL-MCNC: 17 MG/DL (ref 7–18)
CALCIUM SERPL-MCNC: 8.9 MG/DL (ref 8.5–10.1)
CHLORIDE SERPL-SCNC: 100 MMOL/L (ref 98–107)
CO2 SERPL-SCNC: 32 MMOL/L (ref 21–32)
CREAT SERPL-MCNC: 0.44 MG/DL (ref 0.55–1.02)
CREAT/UREA NIT SERPL: 39
ERYTHROCYTE [DISTWIDTH] IN BLOOD BY AUTOMATED COUNT: 15.9 % (ref 11.5–17)
EST. AVERAGE GLUCOSE BLD GHB EST-MCNC: 134 MG/DL
GLUCOSE SERPL-MCNC: 178 MG/DL (ref 74–106)
HBA1C MFR BLD: 6.3 % (ref 4.2–6.3)
HCO3 UR-SCNC: 37.7 MMOL/L (ref 22–26)
HCT VFR BLD AUTO: 28.5 % (ref 37–47)
HGB BLD-MCNC: 8.6 GM/DL (ref 12–16)
MCH RBC QN AUTO: 26.8 PG (ref 27–31)
MCHC RBC AUTO-ENTMCNC: 30.2 GM/DL (ref 33–36)
MCV RBC AUTO: 88.8 FL (ref 80–94)
NRBC BLD AUTO-RTO: 0 % (ref 0–0.2)
PCO2 BLDA: 53 MMHG (ref 35–45)
PH SMN: 7.46 [PH] (ref 7.35–7.45)
PLATELET # BLD AUTO: 490 X10(3)/MCL (ref 130–400)
PMV BLD AUTO: 8.2 FL (ref 7.4–10.4)
PO2 BLDA: 91 MMHG (ref 50–100)
POC BE: 11.9 (ref -2–3)
POTASSIUM SERPL-SCNC: 3.9 MMOL/L (ref 3.5–5.1)
RBC # BLD AUTO: 3.21 X10(6)/MCL (ref 4.2–5.4)
SETTING 1 ABG: ABNORMAL
SODIUM SERPL-SCNC: 136 MMOL/L (ref 136–145)
WBC # SPEC AUTO: 8.6 X10(3)/MCL (ref 4.5–11.5)

## 2019-09-23 LAB
ABS NEUT (OLG): 5.67 X10(3)/MCL (ref 2.1–9.2)
BASOPHILS # BLD AUTO: 0.08 X10(3)/MCL (ref 0–0.2)
BASOPHILS NFR BLD AUTO: 0.9 % (ref 0–1)
BUN SERPL-MCNC: 17 MG/DL (ref 7–18)
CALCIUM SERPL-MCNC: 9.2 MG/DL (ref 8.5–10.1)
CHLORIDE SERPL-SCNC: 98 MMOL/L (ref 98–107)
CO2 SERPL-SCNC: 31 MMOL/L (ref 21–32)
CREAT SERPL-MCNC: 0.64 MG/DL (ref 0.55–1.02)
CREAT/UREA NIT SERPL: 27
EOSINOPHIL # BLD AUTO: 0.26 X10(3)/MCL (ref 0–0.9)
EOSINOPHIL NFR BLD AUTO: 2.9 % (ref 0–6.4)
ERYTHROCYTE [DISTWIDTH] IN BLOOD BY AUTOMATED COUNT: 16 % (ref 11.5–17)
GLUCOSE SERPL-MCNC: 97 MG/DL (ref 74–106)
HCT VFR BLD AUTO: 30.5 % (ref 37–47)
HGB BLD-MCNC: 9.4 GM/DL (ref 12–16)
IMM GRANULOCYTES # BLD AUTO: 0.02 10*3/UL (ref 0–0.02)
IMM GRANULOCYTES NFR BLD AUTO: 0.2 % (ref 0–0.43)
LYMPHOCYTES # BLD AUTO: 2.49 X10(3)/MCL (ref 0.6–4.6)
LYMPHOCYTES NFR BLD AUTO: 27.3 % (ref 16–44)
MCH RBC QN AUTO: 26.9 PG (ref 27–31)
MCHC RBC AUTO-ENTMCNC: 30.8 GM/DL (ref 33–36)
MCV RBC AUTO: 87.4 FL (ref 80–94)
MONOCYTES # BLD AUTO: 0.6 X10(3)/MCL (ref 0.1–1.3)
MONOCYTES NFR BLD AUTO: 6.6 % (ref 4–12.1)
NEUTROPHILS # BLD AUTO: 5.67 X10(3)/MCL (ref 2.1–9.2)
NEUTROPHILS NFR BLD AUTO: 62.1 % (ref 43–73)
NRBC BLD AUTO-RTO: 0 % (ref 0–0.2)
PLATELET # BLD AUTO: 533 X10(3)/MCL (ref 130–400)
PMV BLD AUTO: 8.1 FL (ref 7.4–10.4)
POTASSIUM SERPL-SCNC: 4.3 MMOL/L (ref 3.5–5.1)
RBC # BLD AUTO: 3.49 X10(6)/MCL (ref 4.2–5.4)
SODIUM SERPL-SCNC: 135 MMOL/L (ref 136–145)
WBC # SPEC AUTO: 9.1 X10(3)/MCL (ref 4.5–11.5)

## 2019-09-26 LAB
ABS NEUT (OLG): 6.51 X10(3)/MCL (ref 2.1–9.2)
BUN SERPL-MCNC: 16 MG/DL (ref 7–18)
CALCIUM SERPL-MCNC: 9 MG/DL (ref 8.5–10.1)
CHLORIDE SERPL-SCNC: 100 MMOL/L (ref 98–107)
CO2 SERPL-SCNC: 31 MMOL/L (ref 21–32)
CREAT SERPL-MCNC: 0.67 MG/DL (ref 0.55–1.02)
CREAT/UREA NIT SERPL: 24
ERYTHROCYTE [DISTWIDTH] IN BLOOD BY AUTOMATED COUNT: 16.4 % (ref 11.5–17)
GLUCOSE SERPL-MCNC: 146 MG/DL (ref 74–106)
HCT VFR BLD AUTO: 30.2 % (ref 37–47)
HGB BLD-MCNC: 9 GM/DL (ref 12–16)
MAGNESIUM SERPL-MCNC: 2 MG/DL (ref 1.8–2.4)
MCH RBC QN AUTO: 26.4 PG (ref 27–31)
MCHC RBC AUTO-ENTMCNC: 29.8 GM/DL (ref 33–36)
MCV RBC AUTO: 88.6 FL (ref 80–94)
NRBC BLD AUTO-RTO: 0 % (ref 0–0.2)
PLATELET # BLD AUTO: 551 X10(3)/MCL (ref 130–400)
PMV BLD AUTO: 8.2 FL (ref 7.4–10.4)
POTASSIUM SERPL-SCNC: 4.1 MMOL/L (ref 3.5–5.1)
RBC # BLD AUTO: 3.41 X10(6)/MCL (ref 4.2–5.4)
SODIUM SERPL-SCNC: 135 MMOL/L (ref 136–145)
WBC # SPEC AUTO: 10.1 X10(3)/MCL (ref 4.5–11.5)

## 2019-09-30 LAB
ABS NEUT (OLG): 6 X10(3)/MCL (ref 2.1–9.2)
BUN SERPL-MCNC: 10 MG/DL (ref 7–18)
CALCIUM SERPL-MCNC: 9.3 MG/DL (ref 8.5–10.1)
CHLORIDE SERPL-SCNC: 102 MMOL/L (ref 98–107)
CO2 SERPL-SCNC: 28 MMOL/L (ref 21–32)
CREAT SERPL-MCNC: 0.59 MG/DL (ref 0.55–1.02)
CREAT/UREA NIT SERPL: 17
ERYTHROCYTE [DISTWIDTH] IN BLOOD BY AUTOMATED COUNT: 16.2 % (ref 11.5–17)
GLUCOSE SERPL-MCNC: 110 MG/DL (ref 74–106)
HCT VFR BLD AUTO: 31.1 % (ref 37–47)
HGB BLD-MCNC: 9.4 GM/DL (ref 12–16)
MCH RBC QN AUTO: 26 PG (ref 27–31)
MCHC RBC AUTO-ENTMCNC: 30.2 GM/DL (ref 33–36)
MCV RBC AUTO: 86.1 FL (ref 80–94)
NRBC BLD AUTO-RTO: 0 % (ref 0–0.2)
PLATELET # BLD AUTO: 563 X10(3)/MCL (ref 130–400)
PMV BLD AUTO: 7.9 FL (ref 7.4–10.4)
POTASSIUM SERPL-SCNC: 4 MMOL/L (ref 3.5–5.1)
RBC # BLD AUTO: 3.61 X10(6)/MCL (ref 4.2–5.4)
SODIUM SERPL-SCNC: 137 MMOL/L (ref 136–145)
WBC # SPEC AUTO: 9.6 X10(3)/MCL (ref 4.5–11.5)

## 2020-01-06 ENCOUNTER — HISTORICAL (OUTPATIENT)
Dept: LAB | Facility: HOSPITAL | Age: 60
End: 2020-01-06

## 2020-01-06 LAB
ABS NEUT (OLG): 4.4 X10(3)/MCL (ref 1.5–6.9)
ALBUMIN SERPL-MCNC: 3.6 GM/DL (ref 3.4–5)
ALBUMIN/GLOB SERPL: 0.7 RATIO
ALP SERPL-CCNC: 148 UNIT/L (ref 30–113)
ALT SERPL-CCNC: 21 UNIT/L (ref 10–45)
AST SERPL-CCNC: 10 UNIT/L (ref 15–37)
BASOPHILS # BLD AUTO: 0.1 X10(3)/MCL (ref 0–0.1)
BASOPHILS NFR BLD AUTO: 1 % (ref 0–1)
BILIRUB SERPL-MCNC: 0.3 MG/DL (ref 0.1–0.9)
BILIRUBIN DIRECT+TOT PNL SERPL-MCNC: 0.1 MG/DL (ref 0–0.3)
BILIRUBIN DIRECT+TOT PNL SERPL-MCNC: 0.2 MG/DL
BUN SERPL-MCNC: 12 MG/DL (ref 10–20)
CALCIUM SERPL-MCNC: 9.8 MG/DL (ref 8–10.5)
CHLORIDE SERPL-SCNC: 98 MMOL/L (ref 100–108)
CHOLEST SERPL-MCNC: 189 MG/DL (ref 140–200)
CHOLEST/HDLC SERPL: 3 MG/DL (ref 0–8)
CO2 SERPL-SCNC: 29 MMOL/L (ref 21–35)
CREAT SERPL-MCNC: 0.83 MG/DL (ref 0.7–1.3)
EOSINOPHIL # BLD AUTO: 0.2 X10(3)/MCL (ref 0–0.6)
EOSINOPHIL NFR BLD AUTO: 3 % (ref 0–5)
ERYTHROCYTE [DISTWIDTH] IN BLOOD BY AUTOMATED COUNT: 16 % (ref 11.5–17)
EST. AVERAGE GLUCOSE BLD GHB EST-MCNC: 134 MG/DL
GLOBULIN SER-MCNC: 4.9 GM/DL
GLUCOSE SERPL-MCNC: 129 MG/DL (ref 75–116)
HBA1C MFR BLD: 6.3 % (ref 4.8–6)
HCT VFR BLD AUTO: 38.8 % (ref 36–48)
HDLC SERPL-MCNC: 72 MG/DL (ref 35–59)
HGB BLD-MCNC: 11.4 GM/DL (ref 12–16)
IMM GRANULOCYTES # BLD AUTO: 0.03 10*3/UL (ref 0–0.02)
IMM GRANULOCYTES NFR BLD AUTO: 0.4 % (ref 0–0.43)
LDLC SERPL CALC-MCNC: 100 MG/DL (ref 100–129)
LYMPHOCYTES # BLD AUTO: 2.3 X10(3)/MCL (ref 0.5–4.1)
LYMPHOCYTES NFR BLD AUTO: 30 % (ref 15–40)
MCH RBC QN AUTO: 24 PG (ref 27–34)
MCHC RBC AUTO-ENTMCNC: 29 GM/DL (ref 31–36)
MCV RBC AUTO: 83 FL (ref 80–99)
MONOCYTES # BLD AUTO: 0.5 X10(3)/MCL (ref 0–1.1)
MONOCYTES NFR BLD AUTO: 7 % (ref 4–12)
NEUTROPHILS # BLD AUTO: 4.4 X10(3)/MCL (ref 1.5–6.9)
NEUTROPHILS NFR BLD AUTO: 58 % (ref 43–75)
PLATELET # BLD AUTO: 526 X10(3)/MCL (ref 140–400)
PMV BLD AUTO: 8 FL (ref 6.8–10)
POTASSIUM SERPL-SCNC: 4.4 MMOL/L (ref 3.6–5.2)
PROT SERPL-MCNC: 8.5 GM/DL (ref 6.4–8.2)
RBC # BLD AUTO: 4.67 X10(6)/MCL (ref 4.2–5.4)
SODIUM SERPL-SCNC: 136 MMOL/L (ref 135–145)
TRIGL SERPL-MCNC: 115 MG/DL (ref 35–150)
VLDLC SERPL CALC-MCNC: 23 MG/DL
WBC # SPEC AUTO: 7.5 X10(3)/MCL (ref 4.5–11.5)

## 2020-06-19 ENCOUNTER — HISTORICAL (OUTPATIENT)
Dept: LAB | Facility: HOSPITAL | Age: 60
End: 2020-06-19

## 2020-06-19 LAB
ABS NEUT (OLG): 4 X10(3)/MCL (ref 1.5–6.9)
ALBUMIN SERPL-MCNC: 4 GM/DL (ref 3.4–4.8)
ALBUMIN/GLOB SERPL: 1.1 RATIO (ref 1.1–2)
ALP SERPL-CCNC: 104 UNIT/L (ref 40–150)
ALT SERPL-CCNC: 29 UNIT/L (ref 0–55)
AST SERPL-CCNC: 13 UNIT/L (ref 5–34)
BASOPHILS # BLD AUTO: 0 X10(3)/MCL (ref 0–0.1)
BASOPHILS NFR BLD AUTO: 1 % (ref 0–1)
BILIRUB SERPL-MCNC: 0.5 MG/DL
BILIRUBIN DIRECT+TOT PNL SERPL-MCNC: 0.2 MG/DL (ref 0–0.5)
BILIRUBIN DIRECT+TOT PNL SERPL-MCNC: 0.3 MG/DL (ref 0–0.8)
BUN SERPL-MCNC: 12 MG/DL (ref 9.8–20.1)
CALCIUM SERPL-MCNC: 9.7 MG/DL (ref 8.4–10.2)
CHLORIDE SERPL-SCNC: 103 MMOL/L (ref 98–107)
CHOLEST SERPL-MCNC: 145 MG/DL
CHOLEST/HDLC SERPL: 2 {RATIO} (ref 0–5)
CO2 SERPL-SCNC: 25 MMOL/L (ref 23–31)
CREAT SERPL-MCNC: 0.79 MG/DL (ref 0.55–1.02)
EOSINOPHIL # BLD AUTO: 0.2 X10(3)/MCL (ref 0–0.6)
EOSINOPHIL NFR BLD AUTO: 3 % (ref 0–5)
ERYTHROCYTE [DISTWIDTH] IN BLOOD BY AUTOMATED COUNT: 14.6 % (ref 11.5–17)
EST. AVERAGE GLUCOSE BLD GHB EST-MCNC: 131.2 MG/DL
GLOBULIN SER-MCNC: 3.5 GM/DL (ref 2.4–3.5)
GLUCOSE SERPL-MCNC: 132 MG/DL (ref 82–115)
HBA1C MFR BLD: 6.2 %
HCT VFR BLD AUTO: 40.4 % (ref 36–48)
HDLC SERPL-MCNC: 74 MG/DL (ref 35–60)
HGB BLD-MCNC: 12.6 GM/DL (ref 12–16)
IMM GRANULOCYTES # BLD AUTO: 0.03 10*3/UL (ref 0–0.02)
IMM GRANULOCYTES NFR BLD AUTO: 0.4 % (ref 0–0.43)
LDLC SERPL CALC-MCNC: 46 MG/DL (ref 50–140)
LYMPHOCYTES # BLD AUTO: 2.6 X10(3)/MCL (ref 0.5–4.1)
LYMPHOCYTES NFR BLD AUTO: 35 % (ref 15–40)
MCH RBC QN AUTO: 27 PG (ref 27–34)
MCHC RBC AUTO-ENTMCNC: 31 GM/DL (ref 31–36)
MCV RBC AUTO: 87 FL (ref 80–99)
MONOCYTES # BLD AUTO: 0.5 X10(3)/MCL (ref 0–1.1)
MONOCYTES NFR BLD AUTO: 6 % (ref 4–12)
NEUTROPHILS # BLD AUTO: 4 X10(3)/MCL (ref 1.5–6.9)
NEUTROPHILS NFR BLD AUTO: 55 % (ref 43–75)
PLATELET # BLD AUTO: 373 X10(3)/MCL (ref 140–400)
PMV BLD AUTO: 8.3 FL (ref 6.8–10)
POTASSIUM SERPL-SCNC: 4.3 MMOL/L (ref 3.5–5.1)
PROT SERPL-MCNC: 7.5 GM/DL (ref 5.8–7.6)
RBC # BLD AUTO: 4.65 X10(6)/MCL (ref 4.2–5.4)
SODIUM SERPL-SCNC: 139 MMOL/L (ref 136–145)
TRIGL SERPL-MCNC: 126 MG/DL (ref 37–140)
VLDLC SERPL CALC-MCNC: 25 MG/DL
WBC # SPEC AUTO: 7.4 X10(3)/MCL (ref 4.5–11.5)

## 2020-09-28 ENCOUNTER — HISTORICAL (OUTPATIENT)
Dept: LAB | Facility: HOSPITAL | Age: 60
End: 2020-09-28

## 2020-09-28 LAB
ABS NEUT (OLG): 3.2 X10(3)/MCL (ref 1.5–6.9)
ALBUMIN SERPL-MCNC: 3.7 GM/DL (ref 3.4–4.8)
ALBUMIN/GLOB SERPL: 1 RATIO (ref 1.1–2)
ALP SERPL-CCNC: 96 UNIT/L (ref 40–150)
ALT SERPL-CCNC: 23 UNIT/L (ref 0–55)
AST SERPL-CCNC: 15 UNIT/L (ref 5–34)
BASOPHILS # BLD AUTO: 0.1 X10(3)/MCL (ref 0–0.1)
BASOPHILS NFR BLD AUTO: 1 % (ref 0–1)
BILIRUB SERPL-MCNC: 0.5 MG/DL
BILIRUBIN DIRECT+TOT PNL SERPL-MCNC: 0.2 MG/DL (ref 0–0.5)
BILIRUBIN DIRECT+TOT PNL SERPL-MCNC: 0.3 MG/DL (ref 0–0.8)
BUN SERPL-MCNC: 14 MG/DL (ref 9.8–20.1)
CALCIUM SERPL-MCNC: 9.3 MG/DL (ref 8.4–10.2)
CHLORIDE SERPL-SCNC: 101 MMOL/L (ref 98–107)
CHOLEST SERPL-MCNC: 149 MG/DL
CHOLEST/HDLC SERPL: 2 {RATIO} (ref 0–5)
CO2 SERPL-SCNC: 28 MMOL/L (ref 23–31)
CREAT SERPL-MCNC: 0.88 MG/DL (ref 0.55–1.02)
DEPRECATED CALCIDIOL+CALCIFEROL SERPL-MC: 35.2 NG/ML (ref 6.6–49.9)
EOSINOPHIL # BLD AUTO: 0.1 X10(3)/MCL (ref 0–0.6)
EOSINOPHIL NFR BLD AUTO: 2 % (ref 0–5)
ERYTHROCYTE [DISTWIDTH] IN BLOOD BY AUTOMATED COUNT: 13.8 % (ref 11.5–17)
EST. AVERAGE GLUCOSE BLD GHB EST-MCNC: 142.7 MG/DL
GLOBULIN SER-MCNC: 3.7 GM/DL (ref 2.4–3.5)
GLUCOSE SERPL-MCNC: 162 MG/DL (ref 82–115)
HBA1C MFR BLD: 6.6 %
HCT VFR BLD AUTO: 39.2 % (ref 36–48)
HDLC SERPL-MCNC: 66 MG/DL (ref 35–60)
HGB BLD-MCNC: 12.1 GM/DL (ref 12–16)
IMM GRANULOCYTES # BLD AUTO: 0.01 10*3/UL (ref 0–0.02)
IMM GRANULOCYTES NFR BLD AUTO: 0.2 % (ref 0–0.43)
LDLC SERPL CALC-MCNC: 60 MG/DL (ref 50–140)
LYMPHOCYTES # BLD AUTO: 2.3 X10(3)/MCL (ref 0.5–4.1)
LYMPHOCYTES NFR BLD AUTO: 38 % (ref 15–40)
MCH RBC QN AUTO: 27 PG (ref 27–34)
MCHC RBC AUTO-ENTMCNC: 31 GM/DL (ref 31–36)
MCV RBC AUTO: 88 FL (ref 80–99)
MONOCYTES # BLD AUTO: 0.4 X10(3)/MCL (ref 0–1.1)
MONOCYTES NFR BLD AUTO: 6 % (ref 4–12)
NEUTROPHILS # BLD AUTO: 3.2 X10(3)/MCL (ref 1.5–6.9)
NEUTROPHILS NFR BLD AUTO: 52 % (ref 43–75)
PLATELET # BLD AUTO: 365 X10(3)/MCL (ref 140–400)
PMV BLD AUTO: 8.5 FL (ref 6.8–10)
POTASSIUM SERPL-SCNC: 4.4 MMOL/L (ref 3.5–5.1)
PROT SERPL-MCNC: 7.4 GM/DL (ref 5.8–7.6)
RBC # BLD AUTO: 4.45 X10(6)/MCL (ref 4.2–5.4)
SODIUM SERPL-SCNC: 139 MMOL/L (ref 136–145)
TRIGL SERPL-MCNC: 113 MG/DL (ref 37–140)
VLDLC SERPL CALC-MCNC: 23 MG/DL
WBC # SPEC AUTO: 6.1 X10(3)/MCL (ref 4.5–11.5)

## 2020-09-29 ENCOUNTER — HISTORICAL (OUTPATIENT)
Dept: RADIOLOGY | Facility: HOSPITAL | Age: 60
End: 2020-09-29

## 2020-09-29 ENCOUNTER — HOSPITAL ENCOUNTER (OUTPATIENT)
Dept: ADMINISTRATIVE | Facility: HOSPITAL | Age: 60
End: 2020-09-30
Attending: INTERNAL MEDICINE | Admitting: INTERNAL MEDICINE

## 2020-09-29 LAB
ABS NEUT (OLG): 5.26 X10(3)/MCL (ref 2.1–9.2)
ALBUMIN SERPL-MCNC: 4.1 GM/DL (ref 3.4–4.8)
ALBUMIN/GLOB SERPL: 1.3 RATIO (ref 1.1–2)
ALP SERPL-CCNC: 107 UNIT/L (ref 40–150)
ALT SERPL-CCNC: 30 UNIT/L (ref 0–55)
AST SERPL-CCNC: 15 UNIT/L (ref 5–34)
BASOPHILS # BLD AUTO: 0.1 X10(3)/MCL (ref 0–0.2)
BASOPHILS NFR BLD AUTO: 1 %
BILIRUB SERPL-MCNC: 0.5 MG/DL
BILIRUBIN DIRECT+TOT PNL SERPL-MCNC: 0.2 MG/DL (ref 0–0.5)
BILIRUBIN DIRECT+TOT PNL SERPL-MCNC: 0.3 MG/DL (ref 0–0.8)
BUN SERPL-MCNC: 8.5 MG/DL (ref 9.8–20.1)
CALCIUM SERPL-MCNC: 9.1 MG/DL (ref 8.4–10.2)
CHLORIDE SERPL-SCNC: 102 MMOL/L (ref 98–107)
CO2 SERPL-SCNC: 27 MMOL/L (ref 23–31)
CREAT SERPL-MCNC: 0.73 MG/DL (ref 0.55–1.02)
EOSINOPHIL # BLD AUTO: 0.1 X10(3)/MCL (ref 0–0.9)
EOSINOPHIL NFR BLD AUTO: 1 %
ERYTHROCYTE [DISTWIDTH] IN BLOOD BY AUTOMATED COUNT: 13.6 % (ref 11.5–17)
GLOBULIN SER-MCNC: 3.1 GM/DL (ref 2.4–3.5)
GLUCOSE SERPL-MCNC: 119 MG/DL (ref 82–115)
HCT VFR BLD AUTO: 38.9 % (ref 37–47)
HGB BLD-MCNC: 11.7 GM/DL (ref 12–16)
LYMPHOCYTES # BLD AUTO: 2 X10(3)/MCL (ref 0.6–4.6)
LYMPHOCYTES NFR BLD AUTO: 26 %
MCH RBC QN AUTO: 26.5 PG (ref 27–31)
MCHC RBC AUTO-ENTMCNC: 30.1 GM/DL (ref 33–36)
MCV RBC AUTO: 88.2 FL (ref 80–94)
MONOCYTES # BLD AUTO: 0.4 X10(3)/MCL (ref 0.1–1.3)
MONOCYTES NFR BLD AUTO: 6 %
NEUTROPHILS # BLD AUTO: 5.26 X10(3)/MCL (ref 2.1–9.2)
NEUTROPHILS NFR BLD AUTO: 67 %
PLATELET # BLD AUTO: 410 X10(3)/MCL (ref 130–400)
PMV BLD AUTO: 8.6 FL (ref 9.4–12.4)
POTASSIUM SERPL-SCNC: 4.5 MMOL/L (ref 3.5–5.1)
PROT SERPL-MCNC: 7.2 GM/DL (ref 5.8–7.6)
RBC # BLD AUTO: 4.41 X10(6)/MCL (ref 4.2–5.4)
SODIUM SERPL-SCNC: 140 MMOL/L (ref 136–145)
WBC # SPEC AUTO: 7.9 X10(3)/MCL (ref 4.5–11.5)

## 2020-09-30 LAB
ALBUMIN SERPL-MCNC: 3.3 GM/DL (ref 3.4–4.8)
ALBUMIN/GLOB SERPL: 1.1 RATIO (ref 1.1–2)
ALP SERPL-CCNC: 82 UNIT/L (ref 40–150)
ALT SERPL-CCNC: 22 UNIT/L (ref 0–55)
AST SERPL-CCNC: 12 UNIT/L (ref 5–34)
BILIRUB SERPL-MCNC: 0.5 MG/DL
BILIRUBIN DIRECT+TOT PNL SERPL-MCNC: 0.2 MG/DL (ref 0–0.5)
BILIRUBIN DIRECT+TOT PNL SERPL-MCNC: 0.3 MG/DL (ref 0–0.8)
BUN SERPL-MCNC: 8.5 MG/DL (ref 9.8–20.1)
CALCIUM SERPL-MCNC: 8.3 MG/DL (ref 8.4–10.2)
CHLORIDE SERPL-SCNC: 103 MMOL/L (ref 98–107)
CO2 SERPL-SCNC: 29 MMOL/L (ref 23–31)
CREAT SERPL-MCNC: 0.69 MG/DL (ref 0.55–1.02)
ERYTHROCYTE [DISTWIDTH] IN BLOOD BY AUTOMATED COUNT: 13.9 % (ref 11.5–17)
GLOBULIN SER-MCNC: 3 GM/DL (ref 2.4–3.5)
GLUCOSE SERPL-MCNC: 130 MG/DL (ref 82–115)
HCT VFR BLD AUTO: 33.9 % (ref 37–47)
HGB BLD-MCNC: 10.4 GM/DL (ref 12–16)
MCH RBC QN AUTO: 27.2 PG (ref 27–31)
MCHC RBC AUTO-ENTMCNC: 30.7 GM/DL (ref 33–36)
MCV RBC AUTO: 88.5 FL (ref 80–94)
PLATELET # BLD AUTO: 345 X10(3)/MCL (ref 130–400)
PMV BLD AUTO: 8.7 FL (ref 9.4–12.4)
POTASSIUM SERPL-SCNC: 3.9 MMOL/L (ref 3.5–5.1)
PROT SERPL-MCNC: 6.3 GM/DL (ref 5.8–7.6)
RBC # BLD AUTO: 3.83 X10(6)/MCL (ref 4.2–5.4)
SODIUM SERPL-SCNC: 138 MMOL/L (ref 136–145)
WBC # SPEC AUTO: 6.3 X10(3)/MCL (ref 4.5–11.5)

## 2021-02-23 ENCOUNTER — HISTORICAL (OUTPATIENT)
Dept: LAB | Facility: HOSPITAL | Age: 61
End: 2021-02-23

## 2021-02-23 LAB
ABS NEUT (OLG): 4.2 X10(3)/MCL (ref 1.5–6.9)
ALBUMIN SERPL-MCNC: 3.6 GM/DL (ref 3.4–4.8)
ALBUMIN/GLOB SERPL: 0.9 RATIO (ref 1.1–2)
ALP SERPL-CCNC: 113 UNIT/L (ref 40–150)
ALT SERPL-CCNC: 14 UNIT/L (ref 0–55)
AST SERPL-CCNC: 11 UNIT/L (ref 5–34)
BASOPHILS # BLD AUTO: 0.1 X10(3)/MCL (ref 0–0.1)
BASOPHILS NFR BLD AUTO: 1 % (ref 0–1)
BILIRUB SERPL-MCNC: 0.6 MG/DL
BILIRUBIN DIRECT+TOT PNL SERPL-MCNC: 0.3 MG/DL (ref 0–0.5)
BILIRUBIN DIRECT+TOT PNL SERPL-MCNC: 0.3 MG/DL (ref 0–0.8)
BUN SERPL-MCNC: 10 MG/DL (ref 9.8–20.1)
CALCIUM SERPL-MCNC: 9.4 MG/DL (ref 8.4–10.2)
CHLORIDE SERPL-SCNC: 101 MMOL/L (ref 98–107)
CHOLEST SERPL-MCNC: 125 MG/DL
CHOLEST/HDLC SERPL: 2 {RATIO} (ref 0–5)
CO2 SERPL-SCNC: 28 MMOL/L (ref 23–31)
CREAT SERPL-MCNC: 0.84 MG/DL (ref 0.55–1.02)
EOSINOPHIL # BLD AUTO: 0.2 X10(3)/MCL (ref 0–0.6)
EOSINOPHIL NFR BLD AUTO: 2 % (ref 0–5)
ERYTHROCYTE [DISTWIDTH] IN BLOOD BY AUTOMATED COUNT: 13.3 % (ref 11.5–17)
EST. AVERAGE GLUCOSE BLD GHB EST-MCNC: 131.2 MG/DL
GLOBULIN SER-MCNC: 3.8 GM/DL (ref 2.4–3.5)
GLUCOSE SERPL-MCNC: 117 MG/DL (ref 82–115)
HBA1C MFR BLD: 6.2 %
HCT VFR BLD AUTO: 38.1 % (ref 36–48)
HDLC SERPL-MCNC: 50 MG/DL (ref 35–60)
HGB BLD-MCNC: 12.1 GM/DL (ref 12–16)
IMM GRANULOCYTES # BLD AUTO: 0.02 10*3/UL (ref 0–0.02)
IMM GRANULOCYTES NFR BLD AUTO: 0.3 % (ref 0–0.43)
LDLC SERPL CALC-MCNC: 51 MG/DL (ref 50–140)
LYMPHOCYTES # BLD AUTO: 3.1 X10(3)/MCL (ref 0.5–4.1)
LYMPHOCYTES NFR BLD AUTO: 38 % (ref 15–40)
MCH RBC QN AUTO: 27 PG (ref 27–34)
MCHC RBC AUTO-ENTMCNC: 32 GM/DL (ref 31–36)
MCV RBC AUTO: 86 FL (ref 80–99)
MONOCYTES # BLD AUTO: 0.4 X10(3)/MCL (ref 0–1.1)
MONOCYTES NFR BLD AUTO: 5 % (ref 4–12)
NEUTROPHILS # BLD AUTO: 4.2 X10(3)/MCL (ref 1.5–6.9)
NEUTROPHILS NFR BLD AUTO: 53 % (ref 43–75)
PLATELET # BLD AUTO: 455 X10(3)/MCL (ref 140–400)
PMV BLD AUTO: 8.3 FL (ref 6.8–10)
POTASSIUM SERPL-SCNC: 4 MMOL/L (ref 3.5–5.1)
PROT SERPL-MCNC: 7.4 GM/DL (ref 5.8–7.6)
RBC # BLD AUTO: 4.45 X10(6)/MCL (ref 4.2–5.4)
SODIUM SERPL-SCNC: 138 MMOL/L (ref 136–145)
TRIGL SERPL-MCNC: 120 MG/DL (ref 37–140)
VLDLC SERPL CALC-MCNC: 24 MG/DL
WBC # SPEC AUTO: 8 X10(3)/MCL (ref 4.5–11.5)

## 2021-08-13 ENCOUNTER — HISTORICAL (OUTPATIENT)
Dept: LAB | Facility: HOSPITAL | Age: 61
End: 2021-08-13

## 2021-08-13 LAB
ABS NEUT (OLG): 3.8 X10(3)/MCL (ref 1.5–6.9)
ALBUMIN SERPL-MCNC: 3.9 GM/DL (ref 3.4–4.8)
ALBUMIN/GLOB SERPL: 1.1 RATIO (ref 1.1–2)
ALP SERPL-CCNC: 111 UNIT/L (ref 40–150)
ALT SERPL-CCNC: 13 UNIT/L (ref 0–55)
AST SERPL-CCNC: 10 UNIT/L (ref 5–34)
BASOPHILS # BLD AUTO: 0.1 X10(3)/MCL (ref 0–0.1)
BASOPHILS NFR BLD AUTO: 1 % (ref 0–1)
BILIRUB SERPL-MCNC: 0.6 MG/DL
BILIRUBIN DIRECT+TOT PNL SERPL-MCNC: 0.2 MG/DL (ref 0–0.5)
BILIRUBIN DIRECT+TOT PNL SERPL-MCNC: 0.4 MG/DL (ref 0–0.8)
BUN SERPL-MCNC: 9 MG/DL (ref 9.8–20.1)
CALCIUM SERPL-MCNC: 9.6 MG/DL (ref 8.4–10.2)
CHLORIDE SERPL-SCNC: 99 MMOL/L (ref 98–107)
CHOLEST SERPL-MCNC: 132 MG/DL
CHOLEST/HDLC SERPL: 3 {RATIO} (ref 0–5)
CO2 SERPL-SCNC: 26 MMOL/L (ref 23–31)
CREAT SERPL-MCNC: 0.75 MG/DL (ref 0.55–1.02)
DEPRECATED CALCIDIOL+CALCIFEROL SERPL-MC: 17.1 NG/ML (ref 30–80)
EOSINOPHIL # BLD AUTO: 0.1 X10(3)/MCL (ref 0–0.6)
EOSINOPHIL NFR BLD AUTO: 2 % (ref 0–5)
ERYTHROCYTE [DISTWIDTH] IN BLOOD BY AUTOMATED COUNT: 13.6 % (ref 11.5–17)
EST. AVERAGE GLUCOSE BLD GHB EST-MCNC: 128.4 MG/DL
GLOBULIN SER-MCNC: 3.7 GM/DL (ref 2.4–3.5)
GLUCOSE SERPL-MCNC: 111 MG/DL (ref 82–115)
HBA1C MFR BLD: 6.1 %
HCT VFR BLD AUTO: 38.9 % (ref 36–48)
HDLC SERPL-MCNC: 52 MG/DL (ref 35–60)
HGB BLD-MCNC: 12 GM/DL (ref 12–16)
IMM GRANULOCYTES # BLD AUTO: 0.01 10*3/UL (ref 0–0.02)
IMM GRANULOCYTES NFR BLD AUTO: 0.1 % (ref 0–0.43)
LDLC SERPL CALC-MCNC: 55 MG/DL (ref 50–140)
LYMPHOCYTES # BLD AUTO: 2.4 X10(3)/MCL (ref 0.5–4.1)
LYMPHOCYTES NFR BLD AUTO: 36 % (ref 15–40)
MCH RBC QN AUTO: 27 PG (ref 27–34)
MCHC RBC AUTO-ENTMCNC: 31 GM/DL (ref 31–36)
MCV RBC AUTO: 87 FL (ref 80–99)
MONOCYTES # BLD AUTO: 0.4 X10(3)/MCL (ref 0–1.1)
MONOCYTES NFR BLD AUTO: 6 % (ref 4–12)
NEUTROPHILS # BLD AUTO: 3.8 X10(3)/MCL (ref 1.5–6.9)
NEUTROPHILS NFR BLD AUTO: 56 % (ref 43–75)
PLATELET # BLD AUTO: 426 X10(3)/MCL (ref 140–400)
PMV BLD AUTO: 8.2 FL (ref 6.8–10)
POTASSIUM SERPL-SCNC: 4.6 MMOL/L (ref 3.5–5.1)
PROT SERPL-MCNC: 7.6 GM/DL (ref 5.8–7.6)
RBC # BLD AUTO: 4.49 X10(6)/MCL (ref 4.2–5.4)
SODIUM SERPL-SCNC: 133 MMOL/L (ref 136–145)
TRIGL SERPL-MCNC: 127 MG/DL (ref 37–140)
VLDLC SERPL CALC-MCNC: 25 MG/DL
WBC # SPEC AUTO: 6.9 X10(3)/MCL (ref 4.5–11.5)

## 2022-04-11 ENCOUNTER — HISTORICAL (OUTPATIENT)
Dept: ADMINISTRATIVE | Facility: HOSPITAL | Age: 62
End: 2022-04-11

## 2022-04-28 VITALS
BODY MASS INDEX: 33.74 KG/M2 | HEIGHT: 67 IN | SYSTOLIC BLOOD PRESSURE: 130 MMHG | WEIGHT: 215 LBS | DIASTOLIC BLOOD PRESSURE: 78 MMHG | OXYGEN SATURATION: 97 %

## 2022-04-29 NOTE — ED PROVIDER NOTES
"   Patient:   Graciela Sanchez             MRN: [id number]            FIN: [id number]               Age:   58 years     Sex:  Female     :  1960   Associated Diagnoses:   Atypical chest pain; Exertional dyspnea; HTN (hypertension); Diabetes mellitus   Author:   Jonah Recio MD      Basic Information   Time seen: Date & time 2018 17:07:00.   History source: Patient.   Arrival mode: Ambulance.   History limitation: None.   Additional information: Patient's physician(s): Joshua Arce MD, Chief Complaint from Nursing Triage Note : Chief Complaint   2018 16:37 CDT       Chief Complaint           patient arrives per [facility] from home. c/o SOB on exertion and " funny feeling" to chest and neck. Scheduled for angiogram 18. concerned she may have UTI  .      History of Present Illness   The patient presents with 57 yo F who presents with worsening dyspnea fo rmonths, "but worse today."  A jay jay pac  and     I, , assumed care of this patient at 18:10.    59 y/o CF with a history of DM, HTN, depression, anxiety, bipolar disorder, and 2 known blockages presents to ED via [facility] for SOB with exertion accompanied by nausea for 2-3 months. Pt states she felt much worse today(18) which prompted her to come in.  She has an angiogram scheduled with  for . She states that her chest " does not feel right, however it is not pain" and also complains of a "funny feeling" in her neck. .  The onset was 2-3 months.  The course/duration of symptoms is worsening.  Degree at onset mild.  Degree at present moderate.  The Exacerbating factors is exertion.  The Relieving factors is none.  Risk factors consist of coronary artery disease, diabetes mellitus and hypertension.  Prior episodes: none.  Therapy today: emergency medical services.  Associated symptoms: nausea.        Review of Systems   Constitutional symptoms:  Negative except as documented in HPI.   Skin symptoms:  Negative except as " "documented in HPI.   Eye symptoms:  Negative except as documented in HPI.   ENMT symptoms:  Negative except as documented in HPI.   Respiratory symptoms:  Shortness of breath.   Cardiovascular symptoms:  chest "does not feel right".   Gastrointestinal symptoms:  Nausea.   Genitourinary symptoms:  Negative except as documented in HPI.   Musculoskeletal symptoms:  "funny feeling" in neck.   Neurologic symptoms:  Negative except as documented in HPI.   Psychiatric symptoms:  Negative except as documented in HPI.   Endocrine symptoms:  Negative except as documented in HPI.   Hematologic/Lymphatic symptoms:  Negative except as documented in HPI.   Allergy/immunologic symptoms:  Negative except as documented in HPI.             Additional review of systems information: All other systems reviewed and otherwise negative.      Health Status   Allergies:    Allergic Reactions (Selected)  Severity Not Documented  Amoxicillin- No reactions were documented.  Morphine- Rash..   Medications:  (Selected)   Documented Medications  Documented  Abilify 2 mg oral tablet: 1 tab, Oral, qPM, # 90 tab(s), 0 Refill(s)  Abilify 2 mg oral tablet: 2 mg = 1 tab(s), Oral, qPM, 0 Refill(s)  Aspir 81 oral delayed release tablet: 81 mg = 1 tab(s), Oral, qAM, stopped 3-27-18, # 90 tab(s), 0 Refill(s)  Bactrim  mg-160 mg oral tablet: 1 tab(s), Oral, BID, # 30 tab(s), 0 Refill(s)  Bactrim  mg-160 mg oral tablet: 1 tab(s), Oral, BID, 0 Refill(s)  GLIPIZIDE 5MG TABLET: 5 mg = 1 tab(s), Oral, BID  LANTUS SOLOSTAR 100UNIT/ML SOLUTION PEN-INJECTOR: 10 units = 10 units, Subcutaneous, qPM, has not started yet- will complete tueojo then will start lantus  LOSARTAN POTASSIUM 25MG TABLET: 25 mg = 1 tab(s), Oral, Daily  OMEPRAZOLE 40MG CAPSULE DELAYED RELEASE: 40 mg = 1 cap(s), Oral, Daily  Protonix 40 mg ORAL enteric coated tablet: 40 mg = 1 tab(s), Oral, Daily, 0 Refill(s)  Toujeo SoloStar 300 units/mL subcutaneous solution: 10 units, Subcutaneous, " qPM, # 4.5 mL, 0 Refill(s)  Ventolin HFA 90 mcg/inh inhalation aerosol: 180 mcg = 2 puff(s), INH, q4hr, PRN PRN wheezing, 0 Refill(s)  Ventolin HFA 90 mcg/inh inhalation aerosol: 2 puff(s), INH, q4hr, PRN PRN for wheezing, 0 Refill(s)  buspirone 10 mg tablet: 10 mg = 1 tab(s), Oral, BID  isosorbide MONOnitrate 30 mg oral tablet, Extended Release: 1/2 tab, Oral, qAM, 0 Refill(s)  lamoTRIgine 100 mg oral tablet: 100 mg = 1 tab(s), Oral, BID, 0 Refill(s)  lamotrigine 200 mg tablet: 1 tab, Oral, BID, 1 tab at Noon and 1 tab at HS  metformin 500 mg oral tablet: 500 mg = 1 tab(s), Oral, qAM, with meals, 0 Refill(s)  metoprolol tartrate 25 mg oral tablet: 25 mg = 1 tab(s), Oral, qAM, 0 Refill(s)  sucralfate 1 g oral tablet: 1 gm = 1 tab(s), Oral, qAM, # 60 tab(s), 0 Refill(s)  traZODONE 50 mg oral tablet ( Desyrel ): 200 mg = 4 tab(s), Oral, qPM, 0 Refill(s)  venlafaxine 150 mg tablet extended release 24hr: 150 mg = 1 tab(s), Oral, qAM.      Past Medical/ Family/ Social History   Medical history:    Active  Diabetes ([id number])  Anxiety ([id number])  Depression ([id number])  Heartburn (39940001)  HTN (hypertension) ([id number])  Bipolar ([id number])  Asthma ([id number])  Hernia ([id number]).   Surgical history:    Incision & Drainage (Right, Hand) on 3/28/2018 at 58 Years.  Comments:  3/28/2018 11:18 - Shyann Ramsey RN  auto-populated from documented surgical case  Hernia Repair Incisional (Anterior, Torso) on 3/28/2018 at 58 Years.  Comments:  3/28/2018 11:18 - Shyann Ramsey RN  auto-populated from documented surgical case  Colonoscopy on 12/21/2017 at 57 Years.  Comments:  12/21/2017 08:57 - Sofy Mosquera RN  auto-populated from documented surgical case  Hemicolectomy (Left) on 11/2/2016 at 56 Years.  Comments:  11/2/2016 14:14 - Sanjuanita Gaona RN  auto-populated from documented surgical case  Cholecystectomy Laparoscopic on 9/29/2016  at 56 Years.  Comments:  2016 16:25 - Sanjuanita Gaona RN  auto-populated from documented surgical case  Cholangiogram (Surgery) on 2016 at 56 Years.  Comments:  2016 16:25 - Sanjuanita Gaona RN  auto-populated from documented surgical case  Colonoscopy (None) on 9/15/2016 at 56 Years.  Comments:  9/15/2016 12:29 - Kalli Urena RN  auto-populated from documented surgical case  Esophagogastroduodenoscopy (None) on 9/15/2016 at 56 Years.  Comments:  9/15/2016 12:29 - Kalli Urena RN  auto-populated from documented surgical case  btl.  Total abdominal hysterectomy (corpus and cervix), with or without removal of tube(s), with or without removal of ovary(s); (70775).   delivery only; (41647)..   Family history:    Alzheimer disease  Mother  Lung disease                                                                                                                                                                                                                            2017 17:53:57<$>  Father  Mother  .   Social history: Alcohol use: Denies, Tobacco use: former smoker 10 per day, Drug use: Denies.      Physical Examination               Vital Signs   Vital Signs   2018 17:00 CDT       Peripheral Pulse Rate     96 bpm                             Heart Rate Monitored      96 bpm                             Respiratory Rate          21 br/min                             SpO2                      94 %                             Oxygen Therapy            Room air                             Systolic Blood Pressure   126 mmHg                             Diastolic Blood Pressure  64 mmHg                             Mean Arterial Pressure, Cuff              85 mmHg    2018 16:37 CDT       Temperature Oral          37.1 DegC                             Temperature Oral (calculated)             98.78 DegF                             Peripheral Pulse Rate     94 bpm                              Respiratory Rate          20 br/min                             SpO2                      97 %                             Oxygen Therapy            Room air                             Systolic Blood Pressure   129 mmHg                             Diastolic Blood Pressure  83 mmHg  .   Measurements   7/5/2018 16:37 CDT       Weight Dosing             95.5 kg                             Weight Measured and Calculated in Lbs     210.54 lb                             Weight Estimated          95.5 kg  .   Basic Oxygen Information   7/5/2018 17:00 CDT       SpO2                      94 %                             Oxygen Therapy            Room air    7/5/2018 16:37 CDT       SpO2                      97 %                             Oxygen Therapy            Room air  .   General:  Alert, anxious.    Skin:  Warm, dry, pink.    Head:  Normocephalic, atraumatic.    Neck:  Supple, trachea midline, no tenderness, no JVD, no carotid bruit.    Eye:  Pupils are equal, round and reactive to light, extraocular movements are intact, normal conjunctiva, vision unchanged.    Ears, nose, mouth and throat:  Tympanic membranes clear, oral mucosa moist, no pharyngeal erythema or exudate.    Cardiovascular:  Regular rate and rhythm, No murmur, Normal peripheral perfusion, No edema.    Respiratory:  Lungs are clear to auscultation, respirations are non-labored, breath sounds are equal, Symmetrical chest wall expansion.    Chest wall:  No tenderness, No deformity.    Back:  Nontender, Normal range of motion, Normal alignment.    Musculoskeletal:  Normal ROM, normal strength, no tenderness, no swelling, no deformity.    Gastrointestinal:  Soft, Nontender, Non distended, Normal bowel sounds, No organomegaly.    Neurological:  Alert and oriented to person, place, time, and situation, No focal neurological deficit observed, CN II-XII intact, normal sensory observed, normal motor observed, normal speech observed, normal coordination  observed.    Lymphatics:  No lymphadenopathy.   Psychiatric:  Cooperative, appropriate mood & affect, normal judgment.       Medical Decision Making   Documents reviewed:  Emergency department nurses' notes.   Orders  Launch Orders   Laboratory:  POC BNP iSTAT request (Order): Blood, Stat collect, 7/5/2018 17:08 CDT by Hayde Bowen PA-C, Lab Collect, Print Label By Order Location  POC iSTAT ER Troponin request (Order): Blood, Stat collect, 7/5/2018 17:08 CDT by Hayde Bowen PA-C, Lab Collect, Print Label By Order Location  Urinalysis Complete a reflex to culture (Order): Stat collect, Urine, 7/5/2018 17:08 CDT, Nurse collect, Print Label By Order Location  CMP (Order): Stat collect, 7/5/2018 17:08 CDT, Blood, Lab Collect, Print Label By Order Location, 7/5/2018 17:08 CDT  CBC w/ Auto Diff (Order): Now collect, 7/5/2018 17:08 CDT, Blood, Lab Collect, Print Label By Order Location, 7/5/2018 17:08 CDT  Radiology:  XR Chest 2 Views (Order): Stat, 7/5/2018 17:08 CDT, Chest Pain, None, Wheelchair, Patient Has IV?, Rad Type, Not Scheduled.   Electrocardiogram:  Rate 101, normal sinus rhythm, No ST-T changes, no ectopy, normal ID & QRS intervals, EP Interp.    Results review:  Lab results : Lab View   7/5/2018 18:30 CDT       POC BNP iSTAT             51 pg/mL    7/5/2018 18:29 CDT       POC Troponin              0.00 ng/mL    7/5/2018 17:18 CDT       Sodium Lvl                136 mmol/L                             Potassium Lvl             4.1 mmol/L                             Chloride                  100 mmol/L                             CO2                       29.0 mmol/L                             Calcium Lvl               8.7 mg/dL                             Glucose Lvl               124 mg/dL  HI                             BUN                       12.0 mg/dL                             Creatinine                0.91 mg/dL                             eGFR-AA                   >60 mL/min/1.73 m2  NA                              eGFR-FABI                  >60 mL/min/1.73 m2  NA                             Bili Total                0.2 mg/dL                             Bili Direct               0.10 mg/dL                             Bili Indirect             0.10 mg/dL                             AST                       20 unit/L                             ALT                       46 unit/L                             Alk Phos                  120 unit/L                             Total Protein             7.1 gm/dL                             Albumin Lvl               3.20 gm/dL  LOW                             Globulin                  3.90 gm/dL  HI                             A/G Ratio                 0.8 ratio  LOW                             WBC                       11.5 x10(3)/mcL                             RBC                       3.98 x10(6)/mcL  LOW                             Hgb                       10.5 gm/dL  LOW                             Hct                       35.8 %  LOW                             Platelet                  391 x10(3)/mcL                             MCV                       89.9 fL                             MCH                       26.4 pg  LOW                             MCHC                      29.3 gm/dL  LOW                             RDW                       14.1 %                             MPV                       8.1 fL  LOW                             Abs Neut                  8.34 x10(3)/mcL                             Neutro Auto               72 %  NA                             Lymph Auto                19 %  NA                             Mono Auto                 6 %  NA                             Eos Auto                  2 %  NA                             Abs Eos                   0.2 x10(3)/mcL                             Basophil Auto             1 %  NA                             Abs Neutro                8.34 x10(3)/mcL  HI                              Abs Lymph                 2.2 x10(3)/mcL                             Abs Mono                  0.7 x10(3)/mcL                             Abs Baso                  0.1 x10(3)/mcL  .   Radiology results:  Rad Results (ST)  < 12 hrs   Accession: [id number]  Order: CT Thorax W Contrast  Report Dt/Tm: 07/05/2018 19:11  Report:   CTA chest, PE protocol     INDICATION: Shortness of breath     COMPARISON: Chest x-ray 7/5/2018     TECHNIQUE: Axial CT images were obtained through the chest after IV  contrast administration. Coronal and sagittal reconstructions along  with MIP reconstructions are submitted for interpretation. Total DLP  458. Automated exposure control utilized.     FINDINGS:     No filling defects are in the main pulmonary artery or segmental  branches.     Lung fields have a mosaic attenuation pattern. No pneumothorax,  pleural effusion or lobar type consolidation.     Heart is upper normal in size. Thoracic aorta is normal in caliber.  Central airways are clear.     No enlarged lymph nodes are in the chest.     No acute osseous findings.     Liver is enlarged with decreased attenuation. Gallbladder is  surgically absent.     IMPRESSION:  1. No CT evidence of pulmonary thromboembolism  2. Mosaic attenuation pattern of the lung fields is suggestive of  small airway or small vessel disease  3. Hepatic enlargement with diffuse steatosis      Accession: [id number]  Order: XR Chest 2 Views  Report Dt/Tm: 07/05/2018 17:40  Report:   Indication: Chest pain     Findings: Compared to 4/2/2018. Heart size is normal and lungs are  clear bilaterally. Pulmonary vasculature is normal.     IMPRESSION: No evidence of acute disease.      .      Reexamination/ Reevaluation   Time: 7/5/2018 20:50:00 .   Assessment: Discussed inpatient vs outpatient mgmt.  Pt prefers to stay.      Impression and Plan   Diagnosis   Atypical chest pain (DPO67-EJ R07.89)   Exertional dyspnea (CBV98-VM R06.09)   HTN (hypertension)  (CIE67-BD I10)   Diabetes mellitus (DKM92-QR E11.9)      Calls-Consults   -  7/5/2018 20:49:00 , Shiv RICHARDS, Ava Bassett, johnathan, phone call, recommends can admit or D/C, depending on pt preference.    Plan   Condition: Stable.    Disposition: Admit time  7/5/2018 20:52:00, Place in Observation Telemetry Unit.    Counseled: Patient, Family, Regarding diagnosis, Regarding diagnostic results, Regarding treatment plan, Patient indicated understanding of instructions.    Notes: I, oRma Ramsey, acted solely as a scribe for and in the presence of  who performed the service., I, Dr. Recio, performed all activities above as documented and I am in full agreement with the above documentation..

## 2022-04-29 NOTE — CONSULTS
Patient:   Graciela Sanchez             MRN: [id number]            FIN: [id number]               Age:   60 years     Sex:  Female     :  1960   Associated Diagnoses:   None   Author:   Sacha Gonzales MD      Chief Complaint   2020 13:25 CDT      sent by pcp to evaluate tracheal stenosis, had CT done today. pt having cough, difficulty clearing airway, stridor, denies SOB. no stridor or distress in triage.        History of Present Illness   Patient is a 60-year-old white female who had a CVA require prolonged ventilation and subsequent trach in early to mid . she was subsequently decannulated in 2019.  She is noticed progressive shortness of breath over the last 4 weeks.  She has residual of an expressive aphasia and some right-sided weakness in upper and lower extremities from the CVA.  Cognitively she seems to be very intact.      Health Status   Allergies:    Allergic Reactions (Selected)  Severity Not Documented  Amoxicillin- Unknown.  Morphine- Rash.,    Allergies (2) Active Reaction  amoxicillin Unknown  morphine rash     Current medications:  (Selected)   Prescriptions  Prescribed  Xanax 0.25 mg oral tablet: 0.25 mg = 1 tab(s), Oral, BID, PRN PRN as needed for anxiety, # 20 tab(s), 0 Refill(s)  Documented Medications  Documented  ARIPiprazole 5 mg oral tablet: 10 mg = 2 tab(s), NG, At Bedtime, 0 Refill(s)  Anucort-HC 25 mg rectal suppository: 25 mg = 1 supp, WI (rectal), TID, # 42 supp, 0 Refill(s)  Balmex topical cream: TOP, BID, 0 Refill(s)  Feosol: 325 mg = 1 tab(s), NG, BID, 0 Refill(s)  Ibuprofen 400 mg tablet: = 1 tab(s), Oral, q4hr, PRN PRN as needed for pain, # 60 tab(s), 0 Refill(s)  Plavix 75 mg oral tablet: 75 mg = 1 tab(s), NG, Daily, 0 Refill(s)  Protonix: 40 mg = 1 EA, IV Piggyback, Daily, 0 Refill(s)  albuterol 0.083% inhalation solution: 2.5 mg = 3 mL, NEB, q4hr Resp, PRN PRN wheezing, 0 Refill(s)  aspirin 81 mg oral tablet, CHEWABLE: 81 mg = 1 tab(s), NG,  Daily, 0 Refill(s)  bisacodyl 10 mg RECTAL suppository: 10 mg = 1 supp, NJ (rectal), Daily, PRN PRN other (see comment), 0 Refill(s)  busPIRone 15 mg oral tablet: 30 mg = 2 tab(s), Oral, At Bedtime, 0 Refill(s)  insulin lispro 100 units/mL injectable solution: 2-14 units, Subcutaneous, As Directed, PRN PRN blood glucose, 0 Refill(s)  lamoTRIgine 100 mg oral tablet: 200 mg = 2 tab(s), NG, Daily, 0 Refill(s)  losartan 50 mg oral tablet: 50 mg = 1 tab(s), NG, Daily, 0 Refill(s)  metformin 500 mg oral tablet: 500 mg = 1 tab(s), Oral, BIDAC, 0 Refill(s)  venlafaxine 75 mg oral tablet: 75 mg = 1 tab(s), NG, BID, 0 Refill(s),    No qualifying data available     Problem list:    All Problems  Anxiety / SNOMED CT [id number] / Confirmed  Asthma / SNOMED CT 624766415 / Confirmed  At risk for aspiration / SNOMED CT 7277111248 / Confirmed / Improving  Bipolar / SNOMED CT 180383029 / Confirmed  CAD - Coronary artery disease / SNOMED CT 0724864149 / Confirmed  Depression / SNOMED CT 936772658 / Confirmed  Diabetes / SNOMED CT [id number] / Confirmed  Heartburn / SNOMED CT 52770113 / Confirmed  Hernia / SNOMED CT 9480916103 / Confirmed  HTN (hypertension) / SNOMED CT [id number] / Confirmed  Impaired mobility / SNOMED CT 808315222 / Confirmed / Improving  Language-related cognitive disorder / SNOMED CT 629234700 / Confirmed / Improving  Oropharyngeal dysphagia / SNOMED CT 835491913 / Confirmed / Improving  Tobacco user / SNOMED CT 397502863 / Probable  Total self-care deficit / SNOMED CT 957128654 / Confirmed / Improving      Histories   Past Medical History:    Active  Diabetes ([id number])  Anxiety ([id number])  Depression (152199354)  Heartburn (90238003)  HTN (hypertension) ([id number])  Bipolar (402381387)  Asthma (191082568)  Hernia (5316562390)  CAD - Coronary artery  disease (5997037432)  Resolved  COPD - Chronic obstructive pulmonary disease (640691893):  Resolved.  Pulmonary emphysema (015541225):  Resolved.   Family History:    Alzheimer disease  Mother  Lung disease.....  Father  Mother     Social History        Social & Psychosocial Habits    Alcohol  09/14/2016  Use: Never    Employment/School  12/19/2017  Status: Disabled    Home/Environment  09/15/2016  Lives with: Spouse    Alcohol abuse in household: No    Substance abuse in household: No    Smoker in household: No    Injuries/Abuse/Neglect in household: No    Nutrition/Health  03/27/2018  Type of diet: Diabetic    Sexual  03/27/2018  Do you think of your sexual orientation as: Heterosexual    Substance Use  09/14/2016  Use: Never    Tobacco  02/18/2019  Use: Former smoker, quit more    Type: Cigarettes    Patient Wants Consult For Cessation Counseling N/A    Tobacco use per day: 20    Number of years: 35    Total pack years: 35    Started at age: 15 Years    Stopped at age: 50 Years    07/18/2019  Use: 4 or less cigarettes(less    Type: Cigarettes    Patient Wants Consult For Cessation Counseling No    07/30/2019  Use: Former smoker, quit more    Patient Wants Consult For Cessation Counseling N/A    Abuse/Neglect  07/18/2019  SHX Any signs of abuse or neglect No    07/30/2019  SHX Any signs of abuse or neglect No    Feels unsafe at home: No    Safe place to go: No  .        Physical Examination   Vital Signs   9/29/2020 16:00 CDT      Peripheral Pulse Rate     105 bpm  HI                             Respiratory Rate          23 br/min                             SpO2                      97 %                             Oxygen Therapy            Room air                             Systolic Blood Pressure   149 mmHg  HI                             Diastolic Blood Pressure  94 mmHg  HI                             Mean Arterial Pressure, Cuff              112 mmHg    9/29/2020 13:25 CDT      Temperature Oral           36.3 DegC                             Temperature Oral (calculated)             97.34 DegF                             Peripheral Pulse Rate     108 bpm  HI                             Respiratory Rate          18 br/min                             SpO2                      98 %                             Oxygen Therapy            Room air                             Systolic Blood Pressure   135 mmHg                             Diastolic Blood Pressure  85 mmHg        Vital Signs (last 24 hrs)_____  Last Charted___________  Temp Oral     36.3 DegC  (SEP 29 13:25)  Heart Rate Peripheral   H 105bpm  (SEP 29 16:00)  Resp Rate         23 br/min  (SEP 29 16:00)  SBP      H 149mmHg  (SEP 29 16:00)  DBP      H 94mmHg  (SEP 29 16:00)  SpO2      97 %  (SEP 29 16:00)     General:  Alert and oriented, No acute distress.    HENT:  Normal.    Neck:  Normal.    Flexible nasopharyngoscopy was performed due to her history of recent stridor.  Both true vocal cords appear to be functioning and moving normally.  CT noted abnormality of arytenoid was not noted on physical exam.  While it is difficult to evaluate subglottically there did appear to be a right-sided tracheal lesion I would estimate to be several rings down,  maybe 5, that appeared to be blocking 2/3-3/4 of the trachea.    I reviewed the CT scan with the radiologist and we looked at scans of the chest back from 2018 which was 3 tracheotomy she was noted to have a tortuous turn in her trachea from the right to the left side at about this level.  She did have a CT scan done in 2018 without a trach and in 2019 with a trach.  The scan in 2019 was approximately June and did not appear to have any stenosis at that time.  She was subsequently decannulated in October of that year.  It is difficult to tell on the current scan but the segment appears to be somewhere between 1 to 2 cm.      Review / Management   Results review:     Labs (Last four charted values)  WBC                   7.9 (SEP 29)   Hgb                  L 11.7 (SEP 29)   Hct                  38.9 (SEP 29)   Plt                  H 410 (SEP 29)   Na                   140 (SEP 29)   K                    4.5 (SEP 29)   CO2                  27 (SEP 29)   Cl                   102 (SEP 29)   Cr                   0.73 (SEP 29)   BUN                  L 8.5 (SEP 29)   Glucose Random       H 119 (SEP 29) .       Impression and Plan   Tracheal stenosis    Would like to admit for oxygen saturation monitoring overnight while sleeping.  While awake she was satting at 95 to 97% on room air.  Furthermore she had no distress no air hunger but did experience a slight croupy cough.  She does have exertional dyspnea.  We will continue to follow

## 2022-04-29 NOTE — DISCHARGE SUMMARY
Patient:   Graciela Sanchez             MRN: [id number]            FIN: [id number]               Age:   59 years     Sex:  Female     :  1960   Associated Diagnoses:   None   Author:   Ekta Sierra      Basic Information   Hospital  Course:  This is a 59-year-old female with a history of CAD, COPD/asthma, diabetes type II, hypertension, bipolar disorder, depression, and anxiety. The patient presented to [facility] on 19 for a scheduled LHC.  The patient was last seen in clinic by Dr. Mendieta on 19 with complaints of shortness of breath. A stress test revealed anterior wall ischemia.  On 19 patient underwent LHC which revealed greater than 80% lesion of the proximal LAD, treated with atherectomy, and a drug-eluting stent. Post procedure patient was having difficulty arousing. ABGs were drawn patient placed on BiPAP for a PCO2 of 56.  Patient also noted to have had right-sided upper extremity weakness along with right facial droop. CT of head revealed no definitive acute intracranial abnormalities.  Neurology was consulted, code fast called. CTA was significant for findings consistent with vascular occlusion of the left middle cerebral artery in the region of the bifurcation into the in 3 vasculature at the level of the insula ribbon involving the anterior branch with associated developing ischemic changes without evidence for hemorrhagic conversion. The area of vascular occlusion likely secondary to an embolized calcified plaque. Neurology was consulted for CVA workup.  No neuro intervention suggested from Dr. Aguilar who responded to the code FAST and reviewed CTA images. MRI of brain on 19 showed left middle cerebral artery territory large acute nonhemorrhagic infarct and left inferior cerebellar small acute nonhemorrhagic infarct.  She became more obtunded and was ultimately transferred to the ICU where she was intubated for airway protection and placed on mechanical ventilation.   On 7/20/19 patient was noted to have grunting type respirations. She was transferred to ICU and intubated. A CT of the head was obtained and showed developing infarct of the left MCA anterior distribution without hemorrhagic conversion with no midline shift or herniation and no other acute intracranial abnormalities. EEG on 7/21/19 was abnormal due to findings consistent with nonspecific bilateral brain dysfunction maximally presumed to be related to side effect of sedative medication.  Definite epileptiform activity or electrographic seizure were not recorded. CT head repeated on 7/22 showed similar appearance of left MCA territory infarct with no hemorrhagic transformation evident, no midline shift, and no new parenchymal attenuation abnormality. There was noted difficulty getting the patient off the ventilator secondary to lethargy, tracheostomy placed on 7/25/19. Trach collar trials started on 7/26. She was noted to have been tolerating trach collar during the day with pressure support at night, however, she had an episode of respiratory distress requiring placement on SIMV rate and increasing FiO2. She demonstrated extensive bilateral rhonchi, and chest x-ray revealed increased cephalization of flow. She was given a single dose of diuretic with good results, and left on mandatory rate through the night prior to transferring here to [facility]. She was also receiving enteral nasogastric feedings with vital high protein at 70 mL/h prior to being transferred here to [facility] for continuation of care including vent weaning, nutrition, and PT/OT/ST. Cardiology  recommends administering aspirin and Plavix with no interruptions for at least 6 months and ideally 1 year. On 9/1/19 patient had blood cultures positive for enterococcus faecalis and Staphylococcus, repeat blood cultures on 9/5/19 positive for Streptococcus mitis/oralis.  Patient treated with 2 weeks of vancomycin and repeat blood cultures on 9/7/19 with no growth.  9/4/19 patient with anemia requiring 2 units of PRBCs. H&H improved with transfusion and H&H has remained stable. Patient was subsequently been weaned from the ventilator and was tolerating trach collar well, until 9/7 patient developed increasing shortness of breath with oxygen saturation 60%, patient given suctioned and symptoms resolved and patient is back to baseline immediately after, patient possibly had a mucous plug. Chest x-ray obtained at that time showed mild pulmonary edema. She was given Lasix. Prior to this, on 8/22/19 her trach was downsized from a size 6 to a size 4 because she was unable to use a speaking valve but the subsequently had to have the size 6 replaced because she was unable to tolerate and had increased coughing as well. A CT of her neck was performed to look for tracheal stenosis, which no obvious stenosis was present. There was suspicion for bronchomalacia which was thought to be contributing to her ongoing coughing. A bronchoscopy was performed and showed moderate clear secretions noted and mild bronchomalacia Also, significant Cuneiform and Corniculate cartilage soft tissue swelling was also noted which was obstructing airway proximal to vocal cords during expiration. Pulmonology noted on expiration, it appears to occlude the vocal cords, most likely the reason why patient was not tolerating [place] valve. It was recommended that patient follow up with a bronchoscopy in 4-6 weeks to see if this is still present.  No plan on decannulation until soft tissue swelling has improved.  Patient is now on trach collar tolerating well. Patient also had persistent dysphagia for most of her stay here and received feedings per NG tube. She continued to make slow progress and was finally started on a dysphagia ground diet last week and NG tube was discontinued. She had another swallow study today and is going to be upgraded to a dysphagia chopped diet and has been cleared for think liquids and  may use a straw. She has made significant progress with PT and will be transferred to [facility] today for continuation of care. She ambulated down the hallway in the morning and afternoon yesterday.    Today-  Patient is lying in bed, she is awake and alert with ST and her  is at the bedside. Patient is in good spirits and excited about the results of her swallow study this morning. Discussed discharge plans including necessary follow up appointments and medications with patient and her , answered all questions and bot communicate understanding. Patient reports she is feeling well. She denies any shortness of breath, physical discomfort, or pain. She has no complaints. Last BM yesterday. No acute issues reported overnight.       Review of Systems   Except as documented, all other systems reviewed and negative.      Health Status   Current medications:    Medications (31) Active  Scheduled: (14)  aripiprazole 5 mg Tab UD  10 mg 2 tab(s), NG, At Bedtime  aspirin 81 mg Chew tab  81 mg 1 tab(s), NG, Daily  busPIRone ([facility]) 15 mg Tab  30 mg 2 tab(s), Oral, At Bedtime  clopidogrel 75 mg Tab UD  75 mg 1 tab(s), NG, Daily  ferrous sulfate 325 mg Tab UD  325 mg 1 tab(s), NG, BID  hydrocortisone top 2.5% Cre 30 gm  1 nick, [place] (rectal), BID  lamotrigine 100 mg Tab  200 mg 2 tab(s), NG, Daily  [facility] venlafaxine 75 mg Tab UD  75 mg 1 tab(s), NG, BID  losartan 50 mg Tab UD  50 mg 1 tab(s), NG, Daily  metformin 500 mg Tab UD  500 mg 1 tab(s), Oral, BIDAC  nystatin 841083 u/ml Gilma - 60 mL  500,000 units 5 mL, Oral, QID  pantoprazole  Inj  40 mg 1 EA, IV Piggyback, Daily  sodium chloride 0.9% Inj-10ml  10 mL, IV Push, q12hr  zinc oxide(BALMEX) top-crm 2 oz  1 nick, TOP, BID  Continuous: (0)  PRN: (17)  acetaminophen 160 mg/5 ml 4 oz. Liq.  500 mg 15.63 mL, Oral, q6hr  albuterol 0.083% Sol 3 mL UD  2.5 mg 3 mL, NEB, q4hr Resp  alprazolam 0.25 mg Tab UD  0.25 mg 1 tab(s), Oral, BID  bisacodyl 10 mg Sup  10 mg  1 supp, DE (rectal), Daily  dextrose 50% vial 50 ml  12.5 gm 25 mL, IV Push, Once  dextrose 50% vial 50 ml  12.5 gm 25 mL, IV Push, As Directed  dextrose 50% vial 50 ml  25 gm 50 mL, IV Push, As Directed  glucagon recombinant 1 mg Inj  1 mg 1 EA, IM, q10min  glucagon recombinant 1 mg Inj  1 mg 1 EA, IM, q10min  guaifenesin (SF/AF) 100mg/5ml -4oz Sol  200 mg 10 mL, NG, q4hr  hydrALAzine 20 mg/ml Inj- 1 mL  10 mg 0.5 mL, IV Push, q2hr  ibuprofen 100 mg/5 ml Gilma-120 ml  400 mg 20 mL, Oral, q6hr  insulin lispro 100 units/mL - 10mL vial  2-14 units, Subcutaneous, As Directed  ondansetron 4 mg ODT Tab  8 mg 2 tab(s), Oral, q8hr  phenol top 1.4% Spr  1 spray(s), TOP, q1hr  sodium chloride 0.9% Inj-10ml  10 mL, IV Push, As Directed  sodium chloride 0.9% Inj-10ml  20 mL, IV Push, As Directed        Physical Examination      Vital Signs (last 24 hrs)_____  Last Charted___________  Temp Oral     37.3 DegC  (OCT 01 04:00)  Heart Rate Peripheral   62 bpm  (OCT 01 04:00)  Resp Rate         20 br/min  (OCT 01 04:00)  SBP      126 mmHg  (OCT 01 04:00)  DBP      74 mmHg  (OCT 01 04:00)  SpO2      94 %  (OCT 01 04:00)     General:  Alert and oriented, No acute distress.    HENT:  Normocephalic.    Respiratory:  Respirations are non-labored, Symmetrical chest wall expansion, +trach collar , few coarse upper airway sounds due to trach secretions.    Cardiovascular:  Normal rate, Regular rhythm, No edema.    Gastrointestinal:  Soft, Non-tender, Non-distended.    Musculoskeletal:  Normal range of motion, No deformity.    Integumentary:  Warm, Dry, Intact.    Neurologic:  Alert, Oriented.    Psychiatric:  Cooperative, Appropriate mood & affect.       Review / Management   Results review:     Labs (Last four charted values)  WBC                  9.6 (SEP 30) 10.1 (SEP 26) 9.1 (SEP 23) 8.6 (SEP 16)   Hgb                  L 9.4 (SEP 30) L 9.0 (SEP 26) L 9.4 (SEP 23) L 8.6 (SEP 16)   Hct                  L 31.1 (SEP 30) L 30.2 (SEP 26) L  30.5 (SEP 23) L 28.5 (SEP 16)   Plt                  H 563 (SEP 30) H 551 (SEP 26) H 533 (SEP 23) H 490 (SEP 16)   Na                   137 (SEP 30) L 135 (SEP 26) L 135 (SEP 23) 136 (SEP 16)   K                    4.0 (SEP 30) 4.1 (SEP 26) 4.3 (SEP 23) 3.9 (SEP 16)   CO2                  28.0 (SEP 30) 31.0 (SEP 26) 31.0 (SEP 23) 32.0 (SEP 16)   Cl                   102 (SEP 30) 100 (SEP 26) 98 (SEP 23) 100 (SEP 16)   Cr                   0.59 (SEP 30) 0.67 (SEP 26) 0.64 (SEP 23) L 0.44 (SEP 16)   BUN                  10.0 (SEP 30) 16.0 (SEP 26) 17.0 (SEP 23) 17.0 (SEP 16)   Glucose Random       H 110 (SEP 30) H 146 (SEP 26) 97 (SEP 23) H 178 (SEP 16)   PT                   13.6 (AUG 29)   INR                  L 1.0 (AUG 29)   PTT                  30.0 (AUG 29) .    Laboratory Results      Impression and Plan   Malnutrition Clinical Indicators:       Malnutrition Clinical Indicators: Malnutrition Clinical Indicators Present     Measurements:  Height: 167.64 cm (07/30/2019 15:55)   Weight: 82.3 kg (09/30/2019 06:00)   BMI: 33.09 kg/m2 (07/30/2019 15:55)     Malnutrition Acute Illness or Injury  (09/24/2019 13:47)  Degree of Malnutrition: Does not meet criteria  Energy Intake: Does not meet criteria  Interpretation of Weight Loss: Does not meet criteria  Body Fat: Does not meet criteria  Muscle Mass: Does not meet criteria  Fluid Accumulation: Mild  Reduced  Strength: Unable to evaluate    Malnutrition Score: 0 (07/30/2019 15:55)     Patient has been screened and assessed by RD. See nutrition recommendations documented in Adult Nutrition Powerform. RD will follow patient.  .    Chronic respiratory failure -s/p Trach  Soft tissue swelling proximal to vocal cords on bronchoscopy 9/16, follow up with a bronchoscopy in 4-6 weeks to see if this is still present per pulmonology  Chronic obstructive pulmonary disease  Hx of Large ischemic stroke involving left middle cerebral artery distribution  Valvular heart disease  with moderate aortic insufficiency and mitral regurgitation  CAD, s/post [facility] on 07/18/2019, with atherectomy and drug-eluting stent to an 80% left anterior descending lesion  Follow up with [facility] OP, Continue Plavix for new stent  Diabetes Type II A1C 6.3 on 9/16-controlled  Hypertension, controlled  Hx of Bipolar disorder/anxiety/depression  Anemia -stable      Discharge to [facility]. Patient will need to follow up with Dr. Dial in one month for repeat bronchoscopy. She will also need to follow up with Dr. Mendieta and her PCP, and Dr. Aquino.    Discharge time greater than 45 minutes

## 2022-04-29 NOTE — OP NOTE
PREOPERATIVE DIAGNOSIS:    1. Incisional hernia mid-abdomen.  2. Paronychia of right thumb.    POSTOPERATIVE DIAGNOSIS:    1. Incisional hernia mid-abdomen.  2. Paronychia of right thumb.    OPERATION PERFORMED:    1. Repair of incisional hernia of the mid abdomen using Surgimesh.  2. Incision and drainage of the paronychia right thumb.    ANESTHESIA:  General.    PROCEDURE IN DETAIL:  This 58-year-old female patient was brought to the operating room, placed in supine position.  General anesthesia was given.  Abdomen was prepared and draped in the usual fashion.  The patient also had a Flores catheter introduced.  Through the previous surgical scar, the incision was made in vertical direction in the mid abdomen.  Flaps were raised to either side.  The hernia sac was identified.  This was  from the adjoining subcutaneous tissue.  The fascia on either side was well visualized.  Patient has multiple off small off hernial defect.  Peritoneal cavity opened.  Plenty of adhesions noted, especially adhesion between the hernia sac and the small bowel.  These were all lysed without injuring the small bowel and subsequently small bowel was reduced back into the abdominal cavity.  The sac excised.  Hemostasis was then diathermy.  Following this, the peritoneum was closed by #1 Vicryl continuous sutures.  The fascia approximated with #1 Prolene interrupted sutures.  The Surgimesh was placed over the area, attached to the fascia with 2-0 Ethibond sutures.  Subsequently, the subcutaneous plane tacked down to the Surgimesh and fascia with 2-0 Vicryl interrupted sutures to avoid any dead space.  In the midline, the subcutaneous plane reapproximated with 2-0 Vicryl interrupted sutures in the deeper layer and the superficial layer was closed by 2-0 plain gut interrupted sutures.  Subsequently, skin was approximated with skin clips.  Area cleaned and dressing applied.  Subsequently, the right thumb was prepared and  draped in the usual fashion.  The nail fold infection was incised in vertical direction on the radial aspect of the thumb.  Thick pus drained out.  Culture of specimen obtained.  Wound thoroughly drained and irrigated, subsequently packed with moist gauze and dressing applied.  She tolerated the procedure well.  She received 900 mg of clindamycin prior to surgery.  She was transferred to recovery room in good condition.        JEFF/BHAVNA   DD: 03/28/2018 2132   DT: 03/28/2018 2149  Job # [id number]    cc: Garry Casanova III, M.D.

## 2022-04-29 NOTE — DISCHARGE SUMMARY
She is a patient of Dr. Garry Casanova for whom I am covering.    ADMISSION DIAGNOSES:  Elevated liver function tests, abdominal pain, hepatomegaly, hypertension, anxiety.    HOSPITAL COURSE:  This is a 58-year-old white female who has had a recent ventral hernia operation by Dr. Maurer.  She presented to the emergency room with abdominal pain.  She underwent a CAT scan.  It showed some possible seroma, postsurgical changes.  Abdominal ultrasound did not show any problems with the bile duct or pancreatic duct.  The patient has been taking a large amount of Tylenol in addition to her pain medications.  Dr. Casanova and Dr. Maurer both agreed that the patient's LFTs may have been due to this.  Her LFTs were decreasing on the date of discharge, with an AST of 93, ALT of 316 and alkaline phosphatase of 334.  They had been , , and an alkaline phosphatase of 466.       The patient was adamant about going home.  She preferred outpatient workup.  Dr. Maurer agreed that she could go home, and that he did not feel like she had any postsurgical changes that need to be addressed inpatient.  The patient is to follow up and do labs 04/06/2018, at [facility] with a full liver workup.  Lab orders have been faxed from my office.  The patient is to follow up with Dr. Garry Casanova in 5-7 days.        MANISH/BHAVNA   DD: 04/04/2018 1816   DT: 04/04/2018 1904  Job # [id number]

## 2022-04-29 NOTE — H&P
ADMISSION DIAGNOSES:  Muscle weakness, dehydration, and transaminitis.     Patient was discharged from the hospital last Thursday after mesh for a ventral hernia repair.  She said she did okay over the weekend but developed some fever, nausea and vomiting.  Of note, patient has been admitted.  She has abdominal pain, nausea and vomiting, and also significantly elevated liver enzymes and __________.     Please see her recent admits and discharges over the last 2 weeks.    REVIEW OF SYSTEMS:  As noted in the HPI.    ALLERGIES:  AMOXICILLIN AND MORPHINE.    Blood pressures in the 140s to 150s over 60s to 70s.    PHYSICAL EXAM:  GENERAL:  She is alert and oriented x3.  No apparent distress.   NECK:  No JVD.   HEART:  Regular rate and rhythm.  No rubs, gallops, or murmurs.   ABDOMEN:  Distended over the abdomen, mildly tender around her abdominal incision site.     EXTREMITIES:  No clubbing, cyanosis, or edema.    LABS:  Sodium 130, potassium 5.1, chloride 95, CO2 of 23, calcium 9, glucose 148.  BUN and creatinine 7 and 0.93.       AST of 230, ALT of 531, alk phos of 466.  Lactic acid was 3.  On repeat was 1.8 and 1.3.  Cardiac enzymes are negative.  TSH is 2.4.  White count 12.7.  H and H are 11 and 35.  UA shows moderate amount of bili, 100 of glucose, trace ketones, folate 14, acetaminophen 0.     From April 1 culture, gram-positive cocci.    ASSESSMENT AND PLAN:  This is a 58-year-old white female with underlying diabetes mellitus who presents approximately postoperative day 5 for flushing, nausea, vomiting, and elevated transaminase.       Patient had a CT of the abdomen and ultrasound which showed postsurgical changes of the abdominal wall, scattered foci containing gas and staples.  Hepatomegaly with infiltrations.  I am going to try to see if we can just give her some IV fluids and just kind of cool her off and see if see of some the transaminases are elevated due to GI reasons.       Will continue all  her home medications, and, for DVT prophylaxis, she will be on enoxaparin.        GAIL   DD: 04/03/2018 2329   DT: 04/04/2018 0003  Job # [id number]

## 2022-04-29 NOTE — ED PROVIDER NOTES
"   Patient:   Graciela Sanchez             MRN: [id number]            FIN: [id number]               Age:   60 years     Sex:  Female     :  1960   Associated Diagnoses:   Postoperative subglottic stenosis; Stridor   Author:   Jonah Recio MD      Basic Information   Time seen: Date & time 2020 13:27:00.   History source: Patient.   Arrival mode: Private vehicle.   History limitation: None.   Additional information: Patient's physician(s): Garry Casanova III, MD   Provider/Visit info:    No qualifying data available.   .   History of Present Illness   The patient presents with respiratory problem, 59y/o F presents to the ED for abnormal CT of chest. Family reports patient having trouble " clearing her throat " with SOB on excertion.Trach removed 10/2019.  No distress in triage. 98 % RA Josh FNP-C and I, Dr. Recio, assumed care of this patient upon walking into the room at 1546.    59 y/o CF with a history of DM, HTN, CAD, CVA, aphasia, and tracheostomy, presents to the ED for difficulty breathing for 4 weeks. According to the pt's daughter, the pt went to her PCP today, did a CT, and it was noted that the pt's airway was "the width of a pencil," so she was advised to visit the ED. The pt's daughter also notes that the pt has difficulty clearing her airway and SOB. .  The onset was 4  weeks ago.  The course/duration of symptoms is constant.  Degree at onset moderate.  Degree at present moderate.  The Exacerbating factors is exertion.  The Relieving factors is none.  Risk factors consist of coronary artery disease, diabetes mellitus and hypertension.  Prior episodes: none.  Therapy today: doctor's office visit.  Associated symptoms: none.  Additional history: none.        Review of Systems   Constitutional symptoms:  Negative except as documented in HPI   Skin symptoms:  Negative except as documented in HPI   Eye symptoms:  Negative except as documented in HPI   ENMT symptoms:  " "Negative except as documented in HPI   Respiratory symptoms:  "Pencil width" airway; difficulty clearing airway   Cardiovascular symptoms:  Negative except as documented in HPI   Gastrointestinal symptoms:  Negative except as documented in HPI   Negative except as documented in HPIMusculoskeletal symptoms:  Negative except as documented in HPI   Neurologic symptoms:  Negative except as documented in HPI   Hematologic/Lymphatic symptoms:  Negative except as documented in HPI   Negative except as documented in HPI          Additional review of systems information: All other systems reviewed and otherwise negative.      Health Status   Allergies:    Allergic Reactions (Selected)  Severity Not Documented  Amoxicillin- Unknown.  Morphine- Rash..   Medications:  (Selected)   Prescriptions  Prescribed  Xanax 0.25 mg oral tablet: 0.25 mg = 1 tab(s), Oral, BID, PRN PRN as needed for anxiety, # 20 tab(s), 0 Refill(s)  Documented Medications  Documented  ARIPiprazole 5 mg oral tablet: 10 mg = 2 tab(s), NG, At Bedtime, 0 Refill(s)  Anucort-HC 25 mg rectal suppository: 25 mg = 1 supp, MA (rectal), TID, # 42 supp, 0 Refill(s)  Balmex topical cream: TOP, BID, 0 Refill(s)  Feosol: 325 mg = 1 tab(s), NG, BID, 0 Refill(s)  Ibuprofen 400 mg tablet: = 1 tab(s), Oral, q4hr, PRN PRN as needed for pain, # 60 tab(s), 0 Refill(s)  Plavix 75 mg oral tablet: 75 mg = 1 tab(s), NG, Daily, 0 Refill(s)  Protonix: 40 mg = 1 EA, IV Piggyback, Daily, 0 Refill(s)  albuterol 0.083% inhalation solution: 2.5 mg = 3 mL, NEB, q4hr Resp, PRN PRN wheezing, 0 Refill(s)  aspirin 81 mg oral tablet, CHEWABLE: 81 mg = 1 tab(s), NG, Daily, 0 Refill(s)  bisacodyl 10 mg RECTAL suppository: 10 mg = 1 supp, MA (rectal), Daily, PRN PRN other (see comment), 0 Refill(s)  busPIRone 15 mg oral tablet: 30 mg = 2 tab(s), Oral, At Bedtime, 0 Refill(s)  insulin lispro 100 units/mL injectable solution: 2-14 units, Subcutaneous, As Directed, PRN PRN blood glucose, 0 " Refill(s)  lamoTRIgine 100 mg oral tablet: 200 mg = 2 tab(s), NG, Daily, 0 Refill(s)  losartan 50 mg oral tablet: 50 mg = 1 tab(s), NG, Daily, 0 Refill(s)  metformin 500 mg oral tablet: 500 mg = 1 tab(s), Oral, BIDAC, 0 Refill(s)  venlafaxine 75 mg oral tablet: 75 mg = 1 tab(s), NG, BID, 0 Refill(s).      Past Medical/ Family/ Social History   Medical history:    Active  Diabetes ([id number])  Anxiety ([id number])  Depression ([id number])  Heartburn (52045430)  HTN (hypertension) ([id number])  Bipolar ([id number])  Asthma (744507544)  Hernia ([id number])  CAD - Coronary artery disease ([id number])  Resolved  COPD - Chronic obstructive pulmonary disease ([id number]):  Resolved.  Pulmonary emphysema ([id number]):  Resolved., CVA.   Surgical history:    Tracheostomy (.) on 7/25/2019 at 59 Years.  Comments:  7/25/2019 18:32 COURTNEY Steven RN, Gale Brennan  auto-populated from documented surgical case  Left Heart Catheterization: PTCA with Stent & Atherectomy to Proximal LAD on 7/18/2019 at 59 Years.  Comments:  7/18/2019 16:15 COURTNEY Pelaez RN, Deidra Mendieta  Incision & Drainage (Right, Hand) on 3/28/2018 at 58 Years.  Comments:  3/28/2018 11:18 Shyann Oconnell RN  auto-populated from documented surgical case  Hernia Repair Incisional (Anterior, Torso) on 3/28/2018 at 58 Years.  Comments:  3/28/2018 11:18 Shyann Oconnell RN  auto-populated from documented surgical case  Colonoscopy on 12/21/2017 at 57 Years.  Comments:  12/21/2017 8:57 ANTELMO - Sofy Mosquera RN  auto-populated from documented surgical case  Hemicolectomy (Left) on 11/2/2016 at 56 Years.  Comments:  11/2/2016 14:14 Sanjuanita Garcia RN  auto-populated from documented surgical case  Cholecystectomy Laparoscopic on 9/29/2016 at 56 Years.  Comments:  9/29/2016 16:25 Sanjuanita Garcia RN  auto-populated from documented surgical case  Cholangiogram  (Surgery) on 2016 at 56 Years.  Comments:  2016 16:25 CDT - Candelaria HOLLIDAY, Sanjuanita ANTHONY  auto-populated from documented surgical case  Colonoscopy (None) on 9/15/2016 at 56 Years.  Comments:  9/15/2016 12:29 KANAT Kalli Escamilla RN  auto-populated from documented surgical case  Esophagogastroduodenoscopy (None) on 9/15/2016 at 56 Years.  Comments:  9/15/2016 12:29 Kalli Littlejohn RN  auto-populated from documented surgical case  Total abdominal hysterectomy (corpus and cervix), with or without removal of tube(s), with or without removal of ovary(s); (55253).   delivery only; (15544).  Tubal Ligation.  Left Heart Catheterization: Diagnostic Only.  Comments:  2019 7:20 CDT - Benigno HOLLIDAY, Deidra COOL  2017.   Family history:    Alzheimer disease  Mother  Lung disease.....  Father  Mother  .   Social history: Alcohol use: Denies, Tobacco use: Denies.      Physical Examination               Vital Signs   Vital Signs   2020 13:25 CDT      Temperature Oral          36.3 DegC                             Temperature Oral (calculated)             97.34 DegF                             Peripheral Pulse Rate     108 bpm  HI                             Respiratory Rate          18 br/min                             SpO2                      98 %                             Oxygen Therapy            Room air                             Systolic Blood Pressure   135 mmHg                             Diastolic Blood Pressure  85 mmHg  .   Measurements   2020 13:25 CDT      Weight Dosing             98 kg                             Weight Measured and Calculated in Lbs     216.05 lb                             Weight Estimated          98 kg                             Height/Length Dosing      170.18 cm                             Height/Length Estimated   170.18 cm                             Body Mass Index Estimated 33.84 kg/m2  .   Basic Oxygen Information   2020 13:25 CDT      SpO2                       98 %                             Oxygen Therapy            Room air  .   General:  Alert, no acute distress, obese   Neurological:  Alert and oriented to person, place, time, and situation, No focal neurological deficit observed, CN II-XII intact, normal sensory observed, normal motor observed, normal speech observed, normal coordination observed.    Skin:  Warm, dry, pink   Head:  Normocephalic, atraumatic   Neck:  Supple, trachea midline, no tenderness, no JVD, no carotid bruit.    Eye:  Pupils are equal, round and reactive to light, extraocular movements are intact, normal conjunctiva, vision unchanged   Ears, nose, mouth and throat:  Tympanic membranes clear, oral mucosa moist, Throat: healed tracheostomy scar.    Cardiovascular:  Regular rate and rhythm, No murmur, Normal peripheral perfusion, No edema, Tachycardia   Respiratory:  Breath sounds are equal, Symmetrical chest wall expansion, Respirations: Tachypneic, Breath sounds: Stridor.    Chest wall:  No tenderness, No deformity.    Back:  Nontender, Normal range of motion, Normal alignment   Musculoskeletal:  Normal ROM, normal strength, no tenderness, no swelling, no deformity.    Gastrointestinal:  Soft, Nontender, Non distended, Normal bowel sounds, No organomegaly, Obese   Lymphatics:  No lymphadenopathy.   Psychiatric:  Cooperative, appropriate mood & affect, normal judgment      Medical Decision Making   Documents reviewed:  Emergency department nurses' notes.   Orders  Launch Orders   Laboratory:  CMP (Order): Stat collect, 9/29/2020 13:29 CDT, Blood, Lab Collect, Print Label By Order Location, 9/29/2020 13:29 CDT  CBC w/ Auto Diff (Order): Now collect, 9/29/2020 13:29 CDT, Blood, Lab Collect, Print Label By Order Location, 9/29/2020 13:29 CDT  Patient Care:  Peripheral IV Insertion (Order): 9/29/2020 13:30 CDT, launch Order Profile (Selected)   Inpatient Orders  Ordered  Patient Isolation: 09/29/20 13:28:51 CDT, Contact Precautions, Constant  Indicator  Patient Isolation: 09/29/20 13:28:51 CDT, Droplet Precautions, Constant Indicator  Peripheral IV Insertion: 09/29/20 13:30:00 CDT  Completed  Automated Diff: NOW collect, 09/29/20 13:40:00 CDT, Blood, Collected, Stop date 09/29/20 13:40:00 CDT, Lab Collect, Print Label By Order Location, 09/29/20 13:29:00 CDT  CBC w/ Auto Diff: NOW collect, 09/29/20 13:40:08 CDT, BLOOD, Collected, Stop date 09/29/20 13:40:00 CDT, Lab Collect  CMP: STAT collect, 09/29/20 13:40:08 CDT, BLOOD, Collected, Stop date 09/29/20 13:40:00 CDT, Lab Collect  Estimated Glomerular Filtration Rate: STAT collect, 09/29/20 13:40:00 CDT, Blood, Collected, Stop date 09/29/20 13:40:00 CDT, Lab Collect, Print Label By Order Location, 09/29/20 13:29:00 CDT.    Results review:  Lab results : Lab View   9/29/2020 13:40 CDT      Sodium Lvl                140 mmol/L                             Potassium Lvl             4.5 mmol/L                             Chloride                  102 mmol/L                             CO2                       27 mmol/L                             Calcium Lvl               9.1 mg/dL                             Glucose Lvl               119 mg/dL  HI                             BUN                       8.5 mg/dL  LOW                             Creatinine                0.73 mg/dL                             eGFR-AA                   >60  NA                             eGFR-FABI                  >60  NA                             Bili Total                0.5 mg/dL                             Bili Direct               0.2 mg/dL                             Bili Indirect             0.30 mg/dL                             AST                       15 unit/L                             ALT                       30 unit/L                             Alk Phos                  107 unit/L                             Total Protein             7.2 gm/dL                             Albumin Lvl               4.1 gm/dL                              Globulin                  3.1 gm/dL                             A/G Ratio                 1.3 ratio                             WBC                       7.9 x10(3)/mcL                             RBC                       4.41 x10(6)/mcL                             Hgb                       11.7 gm/dL  LOW                             Hct                       38.9 %                             Platelet                  410 x10(3)/mcL  HI                             MCV                       88.2 fL                             MCH                       26.5 pg  LOW                             MCHC                      30.1 gm/dL  LOW                             RDW                       13.6 %                             MPV                       8.6 fL  LOW                             Abs Neut                  5.26 x10(3)/mcL                             Neutro Auto               67 %  NA                             Lymph Auto                26 %  NA                             Mono Auto                 6 %  NA                             Eos Auto                  1 %  NA                             Abs Eos                   0.1 x10(3)/mcL                             Basophil Auto             1 %  NA                             Abs Neutro                5.26 x10(3)/mcL                             Abs Lymph                 2.0 x10(3)/mcL                             Abs Mono                  0.4 x10(3)/mcL                             Abs Baso                  0.1 x10(3)/mcL    9/28/2020 8:50 CDT       Sodium Lvl                139 mmol/L                             Potassium Lvl             4.4 mmol/L                             Chloride                  101 mmol/L                             CO2                       28 mmol/L                             Calcium Lvl               9.3 mg/dL                             Glucose Lvl               162 mg/dL  HI                             EAG                        142.7 mg/dL  NA                             BUN                       14.0 mg/dL                             Creatinine                0.88 mg/dL                             eGFR-AA                   >60  NA                             eGFR-FABI                  >60  NA                             Bili Total                0.5 mg/dL                             Bili Direct               0.2 mg/dL                             Bili Indirect             0.30 mg/dL                             AST                       15 unit/L                             ALT                       23 unit/L                             Alk Phos                  96 unit/L                             Total Protein             7.4 gm/dL                             Albumin Lvl               3.7 gm/dL                             Globulin                  3.7 gm/dL  HI                             A/G Ratio                 1.0 ratio  LOW                             Hgb A1c                   6.6 %                             Vit D 25 OH               35.2 ng/mL                             Chol                      149 mg/dL                             HDL                       66 mg/dL  HI                             Trig                      113 mg/dL                             LDL                       60.00 mg/dL                             Chol/HDL                  2                             VLDL                      23  NA                             WBC                       6.1 x10(3)/mcL                             RBC                       4.45 x10(6)/mcL                             Hgb                       12.1 gm/dL                             Hct                       39.2 %                             Platelet                  365 x10(3)/mcL                             MCV                       88 fL                             MCH                       27 pg                             MCHC                      31  gm/dL                             RDW                       13.8 %                             MPV                       8.5 fL                             Abs Neut                  3.2 x10(3)/mcL                             Neutro Auto               52 %                             Lymph Auto                38 %                             Mono Auto                 6 %                             Eos Auto                  2 %                             Abs Eos                   0.1 x10(3)/mcL                             Basophil Auto             1 %                             Abs Neutro                3.2 x10(3)/mcL                             Abs Lymph                 2.3 x10(3)/mcL                             Abs Mono                  0.4 x10(3)/mcL                             Abs Baso                  0.1 x10(3)/mcL                             IG%                       0.200 %                             IG#                       0.0100  .   Notes:    Radiology Report  CT CHEST WITHOUT CONTRAST:     HISTORY: R06.1 Stridor     PATIENT RADIATION DOSE:  CTDI vol(mGy) 33.40                                                 DLP(mGycm)  1222.50      As per PQRS measures, all CT scans at this facility used dose  modulation, iterative reconstruction, and/or weight based dose  adjustment when appropriate to reduce radiation dose to as low as  reasonably achievable.     COMPARISON:7/5/2018     Serial axial images were obtained of the chest without the  administration of IV contrast.  Both mediastinal and pulmonary  parenchymal windows were obtained. Degenerative changes and mild  curvature are noted to the thoracic spine. There is mild heterogeneity  of bone trabecula.  There is asymmetric soft tissue fullness/mass  effect at the right posterior aryepiglottic region. There is  underlying atherosclerosis within a right common carotid artery. There  is significant focal narrowing at the upper trachea below the glottis  at  the thoracic inlet. Ill-defined linear calcifications are evident  at the anterior tracheal margin which are not identified on the  previous exam. There is suspect interim mild decrease in size of the  thyroid lobes bilaterally since the prior exam. This may represent  postsurgical changes. Atherosclerosis is seen within the aorta and  branching vessels. Small lymph nodes are seen within the axilla  bilaterally and within the mediastinum. The heart is upper normal in  size. The lungs are relatively clear and well aerated. No infiltrate  or effusion is seen. There is diffuse decreased density throughout the  liver. The gallbladder is surgically absent.        IMPRESSION:  1. Focal narrowing at the upper trachea at the thoracic inlet with  linear radiopaque foci anteriorly and interim architectural distortion  suspicious for postsurgical changes. Clinical correlation is indicated  2. Mild soft tissue/mass effect at the posterior right aryepiglottic  region with underlying atherosclerosis within a right common carotid  artery. Endoscopic examination would allow further evaluation  3. Atherosclerosis  4. Thoracolumbar spondylosis  5. Extensive chest  6. Status post cholecystectomy  7. The referring physician was notified of the findings on 9/29/2020  at 11:30 AM.      .      Impression and Plan   Diagnosis   Postoperative subglottic stenosis (LQI91-JU J95.5)   Stridor (FFI07-JE R06.1)      Calls-Consults   -  9/29/2020 15:45:00 , Janet MCMILLAN, [place], ENT, phone call, recommends will d/w Dr. Fermin, who is on call, and call back, 1614 - Dr. Gonzales coming to evaluate pt in the ED; ENT cart requested, I spoke with MATTHEW Gomez, regarding overnight observation for continuous pulsox monitoring..    Plan   Condition: Guarded.    Disposition: Admit time  9/29/2020 18:06:00, Place in Observation Telemetry Unit.    Counseled: Patient, Family, Regarding diagnosis, Regarding diagnostic results, Regarding treatment plan,  Patient indicated understanding of instructions, Family verbalizes understanding .    Notes: I, Dolores March, acted solely as a scribe for and in the presence of Dr. Recio who performed the service., I, Dr. Recio, performed all activities above as documented and I am in full agreement with the above documentation..

## 2022-04-29 NOTE — OP NOTE
.   03/28/2018.       BRIEF OR NOTE:    Under general anesthesia, patient underwent repair of incisional hernia of the mid abdomen using SurgiMesh.  The patient had multiple defects noted in the mid abdomen.  The repair was done in layers.  She tolerated the procedure well.  The patient also has a small abscess noted on the right thumb which was also drained.  She will be continued on antibiotics following surgery.        JEFF/BHAVNA   DD: 03/28/2018 2134   DT: 03/28/2018 2150  Job # [id number]

## 2022-04-29 NOTE — ED PROVIDER NOTES
Patient:   Graciela Sanchez             MRN: [id number]            FIN: [id number]               Age:   58 years     Sex:  Female     :  1960   Associated Diagnoses:   Abdominal pain; Anxiety; HTN (hypertension); Fever; Abdominal gas pain; Hepatomegaly; Elevated SGOT (AST); Diabetes; Vomiting   Author:   Sharron Mark MD, Carroll Youngblood      Basic Information   Time seen: Date & time 2018 21:30:00.   History source: Patient, daughter.   Arrival mode: Private vehicle, walking.   History limitation: None.   Additional information: Chief Complaint from Nursing Triage Note : Chief Complaint   2018 20:25 CDT       Chief Complaint           Post op hernia complications, fever, vomiting and feeling terrible.  Took Tylenol at 6:30 for the fever.  .      History of Present Illness   The patient presents with abdominal pain.  The onset was 2  days ago.  The course/duration of symptoms is constant and fluctuating in intensity.  The character of symptoms is achy and pressure.  The degree at onset was severe.  The Location of pain at onset was diffuse.  Radiating pain: none. The exacerbating factor is none.  The relieving factor is none.  Therapy today: over the counter medications including rotating tylenol and motrin.  Risk factors consist of recent surgery.  Associated symptoms: nausea, vomiting and fever.  Additional history: none.     The patient presents with nausea and vomiting.  The onset was 1  days ago.  The course/duration of symptoms is constant and fluctuating in intensity.  The character of symptoms is yellow.  The degree at present is minimal.  The exacerbating factor is eating.        Review of Systems   Constitutional symptoms:  Fever, chills, sweats, weakness, fatigue, decreased activity.    Skin symptoms:  Negative except as documented in HPI.   Eye symptoms:  Negative except as documented in HPI.   ENMT symptoms:  Negative except as documented in HPI.   Respiratory symptoms:  Negative except  as documented in HPI.   Cardiovascular symptoms:  Negative except as documented in HPI.   Gastrointestinal symptoms:  Abdominal pain, nausea, vomiting.    Genitourinary symptoms:  Dysuria.   Musculoskeletal symptoms:  Negative except as documented in HPI.   Neurologic symptoms:  Negative except as documented in HPI.   Psychiatric symptoms:  Anxiety, depression.    Endocrine symptoms:  Polyuria, hyperglycemia.    Hematologic/Lymphatic symptoms:  Negative except as documented in HPI.   Allergy/immunologic symptoms:  Negative except as documented in HPI.             Additional review of systems information: All other systems reviewed and otherwise negative.      Health Status   Allergies:    Allergies (2) Active Reaction  amoxicillin None Documented  morphine rash  .   Medications:  (Selected)   Prescriptions  Prescribed  Percocet 5/325 oral tablet: 1 tab(s), Oral, q3hr, PRN PRN pain, X 7 day(s), # 30 tab(s), 0 Refill(s)  clindamycin 300 mg oral capsule: 300 mg = 1 cap(s), Oral, q8hr, X 7 day(s), # 21 cap(s), 0 Refill(s)  Documented Medications  Documented  Abilify 2 mg oral tablet: 1 tab, Oral, qPM, # 90 tab(s), 0 Refill(s)  Abilify 2 mg oral tablet: 2 mg = 1 tab(s), Oral, qPM, 0 Refill(s)  Aspir 81 oral delayed release tablet: 81 mg = 1 tab(s), Oral, qAM, stopped 3-27-18, # 90 tab(s), 0 Refill(s)  Bactrim  mg-160 mg oral tablet: 1 tab(s), Oral, BID, # 30 tab(s), 0 Refill(s)  Bactrim  mg-160 mg oral tablet: 1 tab(s), Oral, BID, 0 Refill(s)  GLIPIZIDE 5MG TABLET: 5 mg = 1 tab(s), Oral, BID  LANTUS SOLOSTAR 100UNIT/ML SOLUTION PEN-INJECTOR: 10 units = 10 units, Subcutaneous, qPM, has not started yet- will complete tueojo then will start lantus  LOSARTAN POTASSIUM 25MG TABLET: 25 mg = 1 tab(s), Oral, Daily  OMEPRAZOLE 40MG CAPSULE DELAYED RELEASE: 40 mg = 1 cap(s), Oral, Daily  Protonix 40 mg ORAL enteric coated tablet: 40 mg = 1 tab(s), Oral, Daily, 0 Refill(s)  Toujeo SoloStar 300 units/mL subcutaneous  solution: 10 units, Subcutaneous, qPM, # 4.5 mL, 0 Refill(s)  Ventolin HFA 90 mcg/inh inhalation aerosol: 180 mcg = 2 puff(s), INH, q4hr, PRN PRN wheezing, 0 Refill(s)  Ventolin HFA 90 mcg/inh inhalation aerosol: 2 puff(s), INH, q4hr, PRN PRN for wheezing, 0 Refill(s)  buspirone 10 mg tablet: 10 mg = 1 tab(s), Oral, BID  isosorbide MONOnitrate 30 mg oral tablet, Extended Release: 1/2 tab, Oral, qAM, 0 Refill(s)  lamoTRIgine 100 mg oral tablet: 100 mg = 1 tab(s), Oral, BID, 0 Refill(s)  lamotrigine 200 mg tablet: 1 tab, Oral, BID, 1 tab at Noon and 1 tab at HS  metformin 500 mg oral tablet: 500 mg = 1 tab(s), Oral, qAM, with meals, 0 Refill(s)  metoprolol tartrate 25 mg oral tablet: 25 mg = 1 tab(s), Oral, qAM, 0 Refill(s)  sucralfate 1 g oral tablet: 1 gm = 1 tab(s), Oral, qAM, # 60 tab(s), 0 Refill(s)  traZODONE 50 mg oral tablet ( Desyrel ): 200 mg = 4 tab(s), Oral, qPM, 0 Refill(s)  venlafaxine 150 mg tablet extended release 24hr: 150 mg = 1 tab(s), Oral, qAM.   Menstrual history: Menopausal.      Past Medical/ Family/ Social History   Medical history:    Active  Anxiety ([id number])  Asthma ([id number])  Bipolar ([id number])  Depression ([id number])  Diabetes ([id number])  Heartburn (06497813)  Hernia ([id number])  HTN (hypertension) ([id number]), Reviewed as documented in chart.   Surgical history:    Incision & Drainage (Right, Hand) on 3/28/2018 at 58 Years.  Comments:  3/28/2018 11:18 - Shyann Ramsey RN  auto-populated from documented surgical case  Hernia Repair Incisional (Anterior, Torso) on 3/28/2018 at 58 Years.  Comments:  3/28/2018 11:18 - Shyann Ramsey RN  auto-populated from documented surgical case  Colonoscopy on 12/21/2017 at 57 Years.  Comments:  12/21/2017 08:57 - Sofy Mosquera RN  auto-populated from documented surgical case  Hemicolectomy (Left) on 11/2/2016 at 56 Years.  Comments:  11/2/2016 14:14 Robbie Gaona  Sanjuanita HOLLIDAY  auto-populated from documented surgical case  Cholecystectomy Laparoscopic on 2016 at 56 Years.  Comments:  2016 16:25 - Sanjuanita Gaona RN  auto-populated from documented surgical case  Cholangiogram (Surgery) on 2016 at 56 Years.  Comments:  2016 16:25 - Sanjuanita Gaona RN  auto-populated from documented surgical case  Colonoscopy (None) on 9/15/2016 at 56 Years.  Comments:  9/15/2016 12:29 - Kalli Urena RN  auto-populated from documented surgical case  Esophagogastroduodenoscopy (None) on 9/15/2016 at 56 Years.  Comments:  9/15/2016 12:29 - Kalli Urena RN  auto-populated from documented surgical case  btl.  Total abdominal hysterectomy (corpus and cervix), with or without removal of tube(s), with or without removal of ovary(s); (51399).   delivery only; (83829)., Reviewed as documented in chart.   Family history:    Father  Lung disease                                                                                                                                                                                                                            2017 17:53:57<$>  Mother  Lung disease                                                                                                                                                                                                                            2017 17:53:57<$>  Alzheimer disease  .   Social history: Reviewed as documented in chart.   Problem list:    Active Problems (9)  Anxiety   Asthma   Bipolar   Depression   Diabetes   Heartburn   Hernia   HTN (hypertension)   Tobacco user   .      Physical Examination               Vital Signs      Vital Signs (last 24 hrs)_____  Last Charted___________  Heart Rate Peripheral   H 116bpm  ( 20:25)  SBP      136 mmHg  ( 20:25)  DBP      82 mmHg  ( 20:25)  SpO2      95 %  ( 20:25)  .   General:  Alert, no acute distress,  anxious.    Skin:  Warm, dry, intact, normal for ethnicity.    Head:  Normocephalic, atraumatic.    Neck:  Supple, trachea midline, no tenderness, no JVD, no carotid bruit.    Eye:  Pupils are equal, round and reactive to light, extraocular movements are intact, normal conjunctiva, vision grossly normal.    Ears, nose, mouth and throat:  Oral mucosa moist, no pharyngeal erythema or exudate.    Cardiovascular:  Regular rate and rhythm, No murmur, Normal peripheral perfusion, No edema.    Respiratory:  Lungs are clear to auscultation, respirations are non-labored, breath sounds are equal, Symmetrical chest wall expansion.    Chest wall:  No tenderness, No deformity.    Back:  Nontender, Normal range of motion, Normal alignment.    Musculoskeletal:  Normal ROM, normal strength, no tenderness, no swelling, no deformity.    Gastrointestinal:  Soft, Non distended, Tenderness: Moderate, generalized, Guarding: Minimal, voluntary, Rebound: Negative, Bowel sounds: Normal, Organomegaly: Negative, Trauma: Negative, Mass: Negative, Scars: midline staple line from recent surgery, Signs: None.    Neurological:  Alert and oriented to person, place, time, and situation, No focal neurological deficit observed, CN II-XII intact, normal sensory observed, normal motor observed, normal speech observed, normal coordination observed.    Lymphatics:  No lymphadenopathy.   Psychiatric:  Cooperative.      Medical Decision Making   Differential Diagnosis:  Abdominal pain, hepatitis, pancreatitis, irritable bowel syndrome, urinary tract infection, pyelonephritis, gastroesophageal reflux disease, gastroenteritis, peptic ulcer disease, diverticulitis.    Differential Diagnosis:  Fever, pneumonia, urinary tract infection.    Differential Diagnosis:  Nausea, vomiting.    Documents reviewed:  Emergency department records, prior records.    Orders  Launch Order Profile (Selected)   Inpatient Orders  Ordered  30 Day Readmission: 04/02/18 20:30:13 CDT,  "Stop date 04/02/18 20:30:13 CDT, "This patient has had an inpatient, observation, outpatient bedded or emergency visit within the last 30 days."  Ordered (Exam Ordered)  XR Abdomen Flat and Erect: Stat, 04/02/18 21:35:00 CDT, Abdominal Pain, None, Wheelchair, Patient Has IV?, Rad Type, 04/02/18 21:35:00 CDT  XR Chest 2 Views: Stat, 04/02/18 21:56:00 CDT, Fever, None, Wheelchair, Patient Has IV?, Rad Type, 04/02/18 21:56:00 CDT  Ordered (In Transit)  Blood Culture: 04/02/18 21:35:00 CDT, Stat collect, Blood, Stop date 04/02/18 21:36:00 CDT  Ordered (In-Lab)  Amylase Level: Stat collect, 04/02/18 21:35:00 CDT, Blood, Stop date 04/02/18 21:36:00 CDT, Lab Collect, 04/02/18 21:35:00 CDT  CMP: Stat collect, 04/02/18 21:35:00 CDT, Blood, Once, Stop date 04/02/18 21:36:00 CDT, Lab Collect, Print Label By Order Location, 04/02/18 21:35:00 CDT  Lactic Acid: Stat collect, 04/02/18 21:35:00 CDT, Blood, Stop date 04/02/18 21:36:00 CDT, Lab Collect, Print Label By Order Location, 04/02/18 21:35:00 CDT  Lipase Level: Stat collect, 04/02/18 21:35:00 CDT, Blood, Stop date 04/02/18 21:36:00 CDT, Lab Collect, 04/02/18 21:35:00 CDT  TSH: Stat collect, 04/02/18 21:35:00 CDT, Blood, Once, Stop date 04/02/18 21:36:00 CDT, Lab Collect, Print Label By Order Location, 04/02/18 21:35:00 CDT  Canceled  Cholangiogram (Surgery): Miguel Tapia MD, General Huynh, 0, None, 10, 10, Concurrent Indicator  Cholangiogram (Surgery): Sabbaghian MD, Miguel, General Endo, 0, None, 10, 10, Concurrent Indicator  Cholecystectomy Laparoscopic: Miguel Tapia MD, Primary Procedure?, General Endo, 28, None, 10, 10, Concurrent Indicator  Cholecystectomy Laparoscopic: Miguel Tapia MD, Primary Procedure?, General Endo, 28, None, 10, 10, Concurrent Indicator  Hernia Repair Incisional: Libertad MCMILLAN , Graham RODNEY, Primary Procedure?, General Endo, 84, None, midline, 10, 10, Concurrent Indicator  MG Screening: Routine, 08/19/16 9:00:00 CDT, Encounter for " screening mammogram for malignant neoplasm of breast, z12.31, None, Ambulatory, Prior films facility name: [facility]; [facility] w/ pt, has orders, Rad Type, Encounter for screening mammogram for malignant neoplasm of b...  Completed  Automated Diff: Now collect, 04/02/18 21:32:00 CDT, Blood, Collected, Stop date 04/02/18 21:32:00 CDT, Lab Collect, 04/02/18 21:35:00 CDT  CBC w/ Auto Diff: Now collect, 04/02/18 21:35:00 CDT, Blood, Stop date 04/02/18 21:36:00 CDT, Lab Collect, 04/02/18 21:35:00 CDT  UA Only Microscopic: Stat collect, Urine, Collected, 04/02/18 21:30:00 CDT, Stop date 04/02/18 21:30:00 CDT, Nurse collect, 04/02/18 21:35:00 CDT  Urinalysis with Microscopic if Indicated: Stat collect, Urine, 04/02/18 21:35:00 CDT, Stop date 04/02/18 21:36:00 CDT, Nurse collect, 04/02/18 21:35:00 CDT.   Results review:  Lab results : Lab View   4/3/2018 1:32 CDT        Lactic Acid Lvl           1.3 mmol/L    4/3/2018 0:15 CDT        Lactic Acid Lvl           1.8 mmol/L    4/2/2018 21:32 CDT       Sodium Lvl                130 mmol/L  LOW                             Potassium Lvl             5.1 mmol/L                             Chloride                  95 mmol/L  LOW                             CO2                       23 mmol/L                             Calcium Lvl               9.0 mg/dL                             Glucose Lvl               148 mg/dL  HI                             BUN                       7 mg/dL  LOW                             Creatinine                0.93 mg/dL                             eGFR-AA                   >60 mL/min/1.73 m2  NA                             eGFR-FABI                  >60 mL/min/1.73 m2  NA                             Amylase Lvl               17 unit/L  LOW                             Bili Total                1.3 mg/dL  HI                             Bili Direct               1.00 mg/dL  HI                             Bili Indirect             0.30 mg/dL  NA                              AST                       293 unit/L  HI                             ALT                       531 unit/L  HI                             Alk Phos                  466 unit/L  HI                             Total Protein             8.1 gm/dL                             Albumin Lvl               2.9 gm/dL  LOW                             Globulin                  5.20 gm/dL  NA                             A/G Ratio                 0.6 ratio  NA                             Lactic Acid Lvl           3.0 mmol/L  CRIT                             Lipase Lvl                39 unit/L                             TSH                       2.416 mIU/mL                             WBC                       12.7 x10(3)/mcL  HI                             RBC                       4.01 x10(6)/mcL  LOW                             Hgb                       11.2 gm/dL  LOW                             Hct                       35.2 %  LOW                             Platelet                  494 x10(3)/mcL  HI                             MCV                       88 fL                             MCH                       28 pg                             MCHC                      32 gm/dL                             RDW                       14.2 %                             MPV                       8.4 fL                             Abs Neut                  11.6 x10(3)/mcL  HI                             Neutro Auto               92 %  HI                             Lymph Auto                3.20 %  LOW                             Mono Auto                 4 %                             Eos Auto                  1 %                             Abs Eos                   0.1 x10(3)/mcL                             Basophil Auto             0 %                             Abs Neutro                11.6 x10(3)/mcL  HI                             Abs Lymph                 0.4 x10(3)/mcL  LOW                             Abs Mono                   0.5 x10(3)/mcL                             Abs Baso                  0.0 x10(3)/mcL    4/2/2018 21:30 CDT       UA Appear                 CLEAR                             UA Color                  DK YELLOW                             UA Spec Grav              1.015  NA                             UA Bili                   MODERATE                             UA pH                     6.5  NA                             UA Urobilinogen           4.0 EU/dL                             UA Blood                  Negative                             UA Glucose                100 mg/dL                             UA Ketones                Trace                             UA Protein                30 mg/dL                             UA Nitrite                Negative                             UA Leuk Est               Negative                             UA WBC                    Rare /HPF                             UA RBC                    0-2 /HPF                             UA Bacteria               None Seen /HPF                             UA Squam Epithelial       None Seen  .   Radiology results:  Computed tomography, with contrast, reviewed radiologist's report.       Impression and Plan   Diagnosis   Abdominal pain (PNED [id number])   Anxiety (MRU38-KI F41.9)   HTN (hypertension) (YEZ99-DJ I10)   Fever (PNED [id number])   Abdominal gas pain (AXL90-CY R14.1)   Hepatomegaly (PDY76-RT R16.0)   Elevated SGOT (AST) (QGT98-RI R74.0)   Diabetes (GMR20-IK E13.9)   Vomiting (PNED [id number])   Plan   Disposition: Admit time  4/3/2018 02:29:00, Place in Observation Unit, spoke with Dr Trent.  Ok to admit to Dr Casanova's service and consult Dr Maurer.    Orders: Launch Orders   Admit/Transfer/Discharge:  Admit to Outpatient Observation (Order): 4/3/2018 2:30 CDT, Jocelyne ASH MD, [facility], No.

## 2022-04-29 NOTE — H&P
CHIEF COMPLAINT:  Bulge noted in the mid abdomen.    HISTORY OF PRESENT ILLNESS:  This 58-year-old female patient was seen and evaluated by Dr. Garry Casanova and referred to me because of an incisional hernia of the mid abdomen.  The patient had a segmental resection of the sigmoid colon done in 2016 because of diverticulosis of the sigmoid colon with a stricture.  She had followed up with me subsequently and was well healed and released.  At present, she has reported back because of a bulge noted in the mid abdominal area on either side of the surgical scar.  The has some discomfort from the same.  The patient has hypertension also.    PAST MEDICAL HISTORY:  The patient has a history of diabetes mellitus, depression, bipolar disease and coronary artery disease    MEDICATIONS:  The patient is on multiple medications at home including:  Venlafaxine, Ultram, Carafate, omeprazole, metoprolol, metformin, losartan, lamotrigine, isosorbide, insulin, glipizide, buspirone, aspirin, Abilify, Ventolin inhaler, etc.    PAST SURGICAL HISTORY:  She gives a history of surgical procedures including  section, bilateral tubal ligation, hysterectomy, colon resection as I mentioned.    SOCIAL HISTORY:  She smokes cigarettes 5-10 cigarettes a day.  No history of alcohol abuse.  No history of any illicit drug use.    FAMILY HISTORY:  Diabetes mellitus and hypertension present.    REVIEW OF SYSTEMS:  RESPIRATORY:  She has shortness of breath.  She uses medications for the same.     CARDIOVASCULAR:  No complaints.     NEUROLOGIC:  No seizures.     GASTROINTESTINAL:  The patient has upper abdominal pain, gastroesophageal reflux disease.   ENDOCRINE:  She has diabetes mellitus.   MUSCULOSKELETAL:  She has pain in multiple joints.     GENITOURINARY:  No complaints.  Had a history of repeated urinary tract infections.     HEMATOLOGIC:  No bleeding tendencies.   PSYCHIATRIC:  She has depression.  She has bipolar disease.   She is on medications.     GENERAL:  The patient has a bulge of the mid abdomen.     OTHER SYSTEMS:  No complaints.    PHYSICAL EXAMINATION:  GENERAL:  General condition of the patient is satisfactory.     VITAL SIGNS:  Stable.     HEENT:  No scalp lesion.  Oral cavity and throat normal.     NECK:  Supple.  No cervical or supraclavicular lymphadenopathy.  Thyroid no nodules.  Trachea central.  No carotid bruits.  Jugular venous pressure normal.   CHEST:  Air entry equal on both sides.     LUNGS:  Clear.  No rales or wheezing.     HEART:  Regular.  No murmur.     BREASTS:  No lumps palpable.  Axillae, no masses.     ABDOMEN:  Soft.  Protruded.  The patient has bulge noted on either side of the surgical scar, in the supraumbilical area.  Both are reducible.  Minimal to moderate pain on palpation.  The lower abdominal wall there is no herniation noted.  Her liver and spleen not enlarged.  No costovertebral angle tenderness.  Inguinal areas normal.  Good bowel sounds.   EXTREMITIES:  Femoral, popliteal, and pedal pulses present.  No pedal edema.  Upper extremities, normal.  Spine is normal.   NEUROLOGIC:  Status normal.    CLINICAL IMPRESSION:  Incisional hernia of mid abdomen.    PLAN:  The patient is scheduled for repair of incisional hernia, mid abdomen.        JEFF/BHAVNA   DD: 03/28/2018 0630   DT: 03/28/2018 0648  Job # [id number]    cc: JUAN Mott III, M.D.

## 2022-04-29 NOTE — CONSULTS
DATE OF CONSULTATION:  2018    CONSULTING PHYSICIAN:  Graham Maurer M.D.    REASON FOR CONSULTATION:  Evaluation of abdominal pain.    CLINICAL EVALUATION/TREATMENT:  This 58-year-old female patient is very well known to me.  I have repaired an incisional hernia of the mid abdomen using a Surgimesh on 2018 and she was discharged home on 2018.  She was doing well at home.  On 2018 around 9:30 in the evening, she reported to the emergency room complaining of abdominal pain, nausea, vomiting and fever.  The patient was seen and evaluated by the emergency room physician, Carroll Mcdermott M.D., and subsequently admitted for observation under Dr. Garry Casanova's service.  The patient has multiple medical problems, including anxiety.  The patient has multiple medical problems, including depression, bipolar disease, diabetes mellitus, coronary artery disease.    MEDICATIONS:  She is on several medications at home, including insulin, glipizide, metformin, metoprolol, Carafate, omeprazole, venlafaxine, Abilify, buspirone, isosorbide, losartan, lamotrigine, Ventolin inhaler, etc.    PAST SURGICAL HISTORY:  She had multiple surgical procedures in the past including  section, bilateral tubal ligation, hysterectomy.  The patient had a sigmoid colon resection for diverticulosis with stricture.  She had a cholecystectomy in 2016.    PHYSICAL EXAMINATION:  GENERAL:  At present, she is fully awake and alert.     VITAL SIGNS:  Stable.   HEENT:  No scalp lesion.  Oral cavity and throat normal.  Her nausea has completely subsided.  She is not having any pain at present.  Oral cavity and throat normal.     NECK:  Supple.  Thyroid, no nodules.  Trachea central.  No carotid bruit.  Jugular veins soft.   CHEST:  Air entry equal on both sides.  No rales or wheezing.     HEART:  Regular.     ABDOMEN:  Soft.  Surgical wound is healthy.  No evidence of infection and no evidence to suggest any fluid  collection or hematoma.  Liver and spleen not palpable.  Liver and spleen not enlarged.  No costovertebral angle tenderness.  Good bowel sounds.  Inguinal area is normal.     EXTREMITIES:  No pedal edema.    LABORATORY DATA:  The patient had a basic workup done while she was in the emergency room last night.  Electrolytes:  Sodium and chloride are low.  Blood glucose is 148 mg/dL.  Her alkaline phosphatase, ALT and AST are elevated.  Lactic acid was 3 on admission, subsequently repeating the same, it has come down to normal.  CBC showed elevation of white cell count 12,700 with 92 neutrophils and 3 lymphocytes.  Platelet count is slightly elevated.  Urinalysis showed specific gravity 1.015, blood glucose positive and trace of ketones.  The patient also had an x-ray of the abdomen done on 04/02/2018, which showed no active diagnosis abdominal abnormality.  The CT scan done on 04/03/2018, showed hepatomegaly with fatty infiltration, postsurgical changes on the abdominal wall, diverticulosis coli without evidence of acute diverticulitis, mild mucosal thickening versus under distention of sigmoid colon, mild atelectasis and scarring of the lung bases.  Because of the elevated liver enzymes, Dr. Garry Casanova, the primary physician, has ordered an ultrasound of the abdomen.  Ultrasound showed mild hepatomegaly with fatty infiltration.  The subcutaneous tissue has complex fluid collection.  Comparing the reports between the ultrasound and CT scan, I am seeing what they are saying is the hernia repair site, the fluid in and around the Surgimesh.  I do not think any need to surgically drain the same at present.    CLINICAL IMPRESSION:    1. Status post incisional hernia repair with Surgimesh.  2. Healing wound of the right thumb.    PLAN:  Because of patient's psychiatric problem, even the smallest discomfort she will get panicky and report to the emergency room.  I have already started the patient on oral feeding.  We  will observe the patient to make sure she is comfortable before going home.  That way we could avoid unnecessary readmission.  Thank you for the consultation.  I will follow the patient along with you.        JEFF/BHAVNA   DD: 04/03/2018 2016   DT: 04/03/2018 2115  Job # [id number]    cc: Garry Casanova III, M.D.

## 2022-04-29 NOTE — OP NOTE
PREOPERATIVE DIAGNOSES:    1. History of diverticulosis, status post resection secondary to severe diverticulosis with obstruction.   2. Severe diverticulosis.    POSTOPERATIVE DIAGNOSIS:    1. Scant left-sided diverticulosis below the point of resection in the sigmoid.  2. Suboptimal prep.    HISTORY OF PRESENT ILLNESS:  This is a 57-year-old white female who had pan diverticulosis and a sigmoid stricture secondary to diverticular disease.  She originally had a scope in September of 2016 at which point she ultimately required resection by Dr. Maurer later in November of last year.  At that point in time because of the stricture on this left side, I was never able to fully clear the colon on the right of any abnormalities.  The purpose of today, therefore, is technically screening of the right side of the colon and then also follow up surveillance of the left side post resection.     The patient was consented for the procedure in my office.  The risks and benefits of the procedure were explained to her in detail.  She was willing to undergo those risks.    PROCEDURE IN DETAIL:  The patient was brought down to the endoscopy suite, laid in the left lateral decubitus position.  Rectal exam was performed.  It was within normal limits.  No internal and no external abnormalities.     The Olympus colonoscope was then lubricated and advanced all the way to the cecum.  The cecum was visualized and photographed.  She had particulate matter that looks like corn and perhaps oranges in her cecum.  I was unable to wash it out and fully view all aspects of the cecum.  I was able to partially intubate the terminal ileum, and it was free of any mass, lesions,  or polyps.  I was able to wash the cecum as much as I could and I did not see any obvious masses or polyps.  The scope was then pulled back and I insufflated the right side.  It was devoid of any masses, lesions or polyps.  Again, the prep was suboptimal.  There were  at points, solid parts of stool, still residual in the colon.  The hepatic flexure was also within normal limits to the same degree as well as the transverse colon and the splenic flexure.  The descending colon was intact without any signs of any diverticulosis.  The stricture was visualized at 20 cm.  There were a few scant 1-2 mm diverticula just about 5 cm past the incision site, but these were minute and inconsequential, and of no clinical significance.  The rectum in retroflexion showed no internal abnormalities.     In summary, a 57-year-old white female with a scant residual diverticulosis of the left side, status post resection, well healing, as well as suboptimal prep.    RECOMMENDATIONS:  Repeat colonoscopy in 3-5 years depending on her clinical circumstances.        SANDER/BHAVNA   DD: 12/21/2017 0902   DT: 12/21/2017 0949  Job # [id number]    cc: JUAN Larsen III, M.D.

## 2022-04-29 NOTE — CONSULTS
DATE OF CONSULTATION:  2018    CONSULTING PHYSICIAN:  Garry Casanova III, M.D.    REQUESTING PHYSICIAN:  Graham Maurer M.D.    REASON FOR CONSULTATION:  Postoperative management.    HISTORY OF PRESENT ILLNESS:  The patient is a 58-year-old white female who had an incisional mid-hernia repair and a paronychia of the right thumb incised and drained.  Please see Dr. Maurer's notes.  He has consulted me for postoperative management.    PAST MEDICAL HISTORY:  Diabetes, anxiety/depression, coronary artery disease, bipolar.    PAST SURGICAL HISTORY:  , tubal ligation, cholecystectomy , subsegmental left colectomy 2016.    FAMILY HISTORY:  Father  at 46, unknown diagnosis.  Mother  at 81, tumor of some sort.    SOCIAL HISTORY:  She is a smoker.  She does not drink alcohol.  Lives locally in [place].  Has some bipolarity as well.    ALLERGIES:  AMOXICILLIN AND MORPHINE.      PHYSICAL EXAMINATION:  VITALS:  Patient's vitals show that she is mildly tachycardic postoperatively in the one-teens.  She is now in the 80s tonight.  Respirations are in the 18 range to 20 range.  She is afebrile.  O2 saturations are increasing from the 90s to upper 90s on 4 L nasal cannula.  Blood pressures are 98/62 to 133/70.    LABORATORY DATA:  CMP is within normal limits.  PT/INR within normal limits.  White count was 11.6.  The rest of the CBC within normal limits.     Urine culture on the  is negative.    ASSESSMENT AND PLAN:  A 58-year-old white female with underlying bipolarity disease, coronary artery disease, hypertension, postoperative day 0 for an umbilical hernia repair.     From a cardiovascular standpoint, we will continue her aspirin, her losartan and her metoprolol.     From a GI standpoint, I will defer to Dr. Maurer when he wants to advance her diet.     From a psychiatric standpoint, continue antipsychotics, buspirone, lamotrigine, venlafaxine.     From the standpoint of  Infectious Disease, she is on clindamycin.     From a diabetic standpoint, she is on metformin.  I have added sliding scale insulin.     I am not clear why she is on Bactrim b.i.d.  I will follow up on this.     We will continue to follow up clinically.  DVT prophylaxis will initiate in the morning with enoxaparin if she needs to stay another day.  GI prophylaxis with pantoprazole.  I am going to put some SCDs on her as well.        SANDER/BHAVNA   DD: 03/28/2018 2210   DT: 03/28/2018 2232  Job # [id number]

## 2022-04-29 NOTE — DISCHARGE SUMMARY
"   Patient:   Graciela Sanchez             MRN: [id number]            FIN: [id number]               Age:   58 years     Sex:  Female     :  1960   Associated Diagnoses:   None   Author:   Ava Barry      Basic Information   Source of history:  Self, Family member.    Present at bedside:  Significant other, Medical personnel.    Referral source:  Emergency department.    History limitation:  None.       Chief Complaint   2018 16:37 CDT       patient arrives per [facility] from home. c/o SOB on exertion and " funny feeling" to chest and neck. Scheduled for angiogram 18. concerned she may have UTI        History of Present Illness   58 year old WF, known to [facility] and followed per Dr Joshua Arce at [facility],  with known medical history: CAD per CT CACS= 720, chest discomfort, GÓMEZ/SOB-- worse lately for about 2 mo and affects daily life, fatigue, HLP (pt stopped statin due to elevated LFTs and elevated glucose) former smoker, and DM. Presents to [facility] ED w/ complaints of SOB with exertion accompanied by nausea for 2-3 months. Pt states she felt much worse today(18) which prompted her to come in.   She states that her chest " does not feel right, however it is not pain" and also complains of a "funny feeling" in her neck. She has an angiogram scheduled with  for .  Pt states that her symptoms or mostly SOB w/ exertion, some time associated w/ N/V. Pt denies chest pain w/ SOB.     18 mornign : At time of eval, pt is in bed, denies SOB,CP, PALP, N/V, sypmtoms improved, since resting. [facility] consulted for admission/further eval.  18 afternoon: Feeling well. Denies CP or SOB.       PMHX: CAD, HTN , deperssion, bipolar, DM2  Past Surgical: Hystorectomy, tubaligaton  Social Hx: former smoker, quit   Family Hx: Mother - HTN, Lung tumor        Father - lung ds       Brother - PAD    Previous testing:  CACS .16: 720  PET 16:   consistent with ischemia near apex " and distal inferior wall.   ECHO 8.31.16: normal LV , EF 55%, Mild to moderate (1-2+) aortic regurgitation. Mild to moderate (1-2+) mitral regurgitation. Mild to moderate (1-2+) mitral regurgitation             Review of Systems   Constitutional:  Negative.    Eye:  Negative.    Ear/Nose/Mouth/Throat:  Negative.    Respiratory:  Negative.    Cardiovascular:  Negative.    Gastrointestinal:  Negative.    Genitourinary:  Negative.    Gynecologic:  Negative.    Hematology/Lymphatics:  Negative.    Endocrine:  Negative.    Immunologic:  Negative.    Musculoskeletal:  Negative.    Integumentary:  Negative.    Neurologic:  Negative.    Psychiatric:  Negative.    All other systems are negative      Health Status   Allergies:    Allergic Reactions (Selected)  Severity Not Documented  Amoxicillin- No reactions were documented.  Morphine- Rash.   Current medications:  (Selected)   Inpatient Medications  Ordered  Sodium Chloride 0.45% intravenous solution 1,000 mL: 1,000 mL, 1,000 mL, IV, 100 mL/hr, start date 07/06/18 13:54:00 CDT  Sodium Chloride 0.9% intravenous solution 1,000 mL: 1,000 mL, 1,000 mL, IV, 75 mL/hr, start date 07/06/18 10:32:00 CDT  Zofran INJ.  (IV Push / IM)  2 mg/mL: 4 mg, form: Injection, IV Push, q4hr PRN for nausea, first dose 07/05/18 23:06:00 CDT, STAT  acetaminophen: 650 mg, form: Tab, Oral, q4hr PRN for headache, first dose 07/05/18 23:06:00 CDT  aspirin: 324 mg, form: Tab-Chew, Oral, Daily, first dose 07/06/18 9:00:00 CDT  nitroglycerin 2% transdermal ointment: 1 in, form: Ointment, TOP, q6hr, first dose 07/06/18 0:00:00 CDT  nitroglycerin: 0.4 mg, form: Tab-SL, SL, q5min PRN for chest pain, Order duration: 3 dose(s), first dose 07/05/18 23:06:00 CDT, stop date Limited # of times, Hold if SBP < 100  Documented Medications  Documented  Abilify 2 mg oral tablet: 2 mg = 1 tab(s), Oral, qPM, 0 Refill(s)  Aspir 81 oral delayed release tablet: 81 mg = 1 tab(s), Oral, qAM, stopped 3-27-18, # 90 tab(s), 0  Refill(s)  Bactrim  mg-160 mg oral tablet: 1 tab(s), Oral, BID, # 30 tab(s), 0 Refill(s)  Bactrim  mg-160 mg oral tablet: 1 tab(s), Oral, BID, 0 Refill(s)  LANTUS SOLOSTAR 100UNIT/ML SOLUTION PEN-INJECTOR: 10 units = 10 units, Subcutaneous, qPM, has not started yet- will complete tueojo then will start lantus  LOSARTAN POTASSIUM 25MG TABLET: 25 mg = 1 tab(s), Oral, Daily  OMEPRAZOLE 40MG CAPSULE DELAYED RELEASE: 40 mg = 1 cap(s), Oral, Daily  Ventolin HFA 90 mcg/inh inhalation aerosol: 2 puff(s), INH, q4hr, PRN PRN for wheezing, 0 Refill(s)  buspirone 10 mg tablet: 10 mg = 1 tab(s), Oral, BID  isosorbide MONOnitrate 30 mg oral tablet, Extended Release: 1/2 tab, Oral, qAM, 0 Refill(s)  lamoTRIgine 100 mg oral tablet: 100 mg = 1 tab(s), Oral, BID, 0 Refill(s)  lamotrigine 200 mg tablet: 1 tab, Oral, BID, 1 tab at Noon and 1 tab at HS  metformin 500 mg oral tablet: 1,000 mg, Oral, BID, with meals, 0 Refill(s)  metoprolol tartrate 25 mg oral tablet: 25 mg = 1 tab(s), Oral, qAM, 0 Refill(s)  sucralfate 1 g oral tablet: 1 gm = 1 tab(s), Oral, qAM, # 60 tab(s), 0 Refill(s)  traZODONE 50 mg oral tablet ( Desyrel ): 200 mg = 4 tab(s), Oral, qPM, 0 Refill(s)  traZODone 100 mg oral tablet: 200 mg, Oral, At Bedtime, 0 Refill(s)  venlafaxine 150 mg tablet extended release 24hr: 150 mg = 1 tab(s), Oral, qAM      Histories   Past Medical History:    Active  Diabetes ([id number])  Anxiety ([id number])  Depression ([id number])  Heartburn (98473525)  HTN (hypertension) ([id number])  Bipolar ([id number])  Asthma (906926411)  Hernia ([id number])   Family History:    Alzheimer disease  Mother  Lung disease                                                                                                                                                                                                                            07-JUN-2017  17:53:57<$>  Father  Mother     Procedure history:    Incision & Drainage (Right, Hand) on 3/28/2018 at 58 Years.  Comments:  3/28/2018 11:18 - Shyann Ramsey RN  auto-populated from documented surgical case  Hernia Repair Incisional (Anterior, Torso) on 3/28/2018 at 58 Years.  Comments:  3/28/2018 11:18 - Shyann Ramsey RN  auto-populated from documented surgical case  Colonoscopy on 2017 at 57 Years.  Comments:  2017 08:57 - Sofy Mosquera RN  auto-populated from documented surgical case  Hemicolectomy (Left) on 2016 at 56 Years.  Comments:  2016 14:14 - Sanjuanita Gaona RN  auto-populated from documented surgical case  Cholecystectomy Laparoscopic on 2016 at 56 Years.  Comments:  2016 16:25 - Sanjuanita Gaona RN  auto-populated from documented surgical case  Cholangiogram (Surgery) on 2016 at 56 Years.  Comments:  2016 16:Kevin - Sanjuanita Gaona RN  auto-populated from documented surgical case  Colonoscopy (None) on 9/15/2016 at 56 Years.  Comments:  9/15/2016 12:29 - Kalli Urena RN  auto-populated from documented surgical case  Esophagogastroduodenoscopy (None) on 9/15/2016 at 56 Years.  Comments:  9/15/2016 12:29 - Kalli Urena RN  auto-populated from documented surgical case  btl.  Total abdominal hysterectomy (corpus and cervix), with or without removal of tube(s), with or without removal of ovary(s); (46388).   delivery only; (88395).   Social History        Social & Psychosocial Habits    Alcohol  2016  Use: Never    Employment/School  2017  Status: Disabled    Home/Environment  09/15/2016  Lives with: Spouse    Alcohol abuse in household: No    Substance abuse in household: No    Smoker in household: No    Injuries/Abuse/Neglect in household: No    Nutrition/Health  2018  Type of diet: Diabetic    Sexual  2018  Self described orientation: Heterosexual    Substance Abuse  2016  Use: Never    Tobacco  2017   Use: Former smoker    Type: Cigarettes    Tobacco use per day: 10    Stopped at age: 56 Years  .        Physical Examination   General:  Alert and oriented, No acute distress.    Eye:  Extraocular movements are intact, Normal conjunctiva.    HENT:  Normocephalic.    Neck:  Supple, Non-tender.    Respiratory:  Respirations are non-labored, Breath sounds are equal, Symmetrical chest wall expansion.    Cardiovascular:  Normal rate, Regular rhythm, No edema, R groin soft and flat.    Gastrointestinal:  Soft, Non-tender.       Vital Signs (last 24 hrs)_____  Last Charted___________  Temp Oral     36.7 DegC  (JUL 06 13:38)  Heart Rate Peripheral   98 bpm  (JUL 06 14:43)  Resp Rate         18 br/min  (JUL 06 13:38)  SBP      133 mmHg  (JUL 06 14:43)  DBP      84 mmHg  (JUL 06 14:43)  SpO2      96 %  (JUL 06 02:43)  Weight      95.5 kg  (JUL 06 02:43)  Height      165 cm  (JUL 06 02:43)  BMI      35.08  (JUL 06 02:43)     Musculoskeletal:  Normal range of motion, Normal strength.    Integumentary:  Warm, Dry.    Neurologic:  Alert, Oriented.    Psychiatric:  Cooperative, Appropriate mood & affect.       Review / Management   Results review:     Labs (Last four charted values)  WBC                  11.5 (JUL 05)   Hgb                  L 10.5 (JUL 05)   Hct                  L 35.8 (JUL 05)   Plt                  391 (JUL 05)   Na                   136 (JUL 05)   K                    4.1 (JUL 05)   CO2                  29.0 (JUL 05)   Cl                   100 (JUL 05)   Cr                   0.91 (JUL 05)   BUN                  12.0 (JUL 05)   Glucose Random       H 124 (JUL 05)   PT                   13.1 (JUL 06)   INR                  0.96 (JUL 06)   PTT                  29.4 (JUL 06) .    Laboratory Results      Impression and Plan     Impresson:  SOB w/ exertion   CXR negative, CT CHEST negative PE, Mosaic attenuation pattern of the lung fields is suggestive of   small airway or small vessel disease  HTN  Nonobstructive  CAD s/p [facility] 7.6.18 (report not yet available)  DM2  Depression  Bipolar    Plan:  DC home after post op time is complete  Post op care and restrictions discussed.   Cont medical management of nonobstructive CAD (no new meds prescribed)

## 2022-04-29 NOTE — OP NOTE
Patient:   Graciela Sanchez             MRN: [id number]            FIN: [id number]               Age:   59 years     Sex:  Female     :  1960   Associated Diagnoses:   None   Author:   Arjun Dial MD      Procedure   Bronchoscopy procedure   Date:  2019.     Date/ Time:  2019 14:52:00.     Confirmed: patient, procedure, side, site, safety procedures followed.     Performed by: self.     Informed consent: not signed, covered by global consent for care.     Indication: failure to tolerate PM valve. Cocern for bronchomalacia and tracheal obstruction.     Preparation: NPO, pre-medications given moderate sedation, pre-oxygenation  100  %.     Technique: type of bronchoscope flexible, route trach, suctioning, monitoring during procedure (blood pressure monitoring, cardiac monitor, continuous pulse oximetry,  volumes/peak inspiratory pressure).     Findings: bronchoscopy performed via trach. Moderate clear secretions noted and mild bronchomalcia noted. Nasal bronch then peformed and significant Cuneiform and Corniculate cartilage soft tissue swelling noted which was obstructing airway proximal to vocal cords during expiration. Copious oral secretions noted.     Procedure tolerated: well.

## 2022-04-29 NOTE — CONSULTS
Patient:   Graciela Sanchez             MRN: [id number]            FIN: [id number]               Age:   59 years     Sex:  Female     :  1960   Associated Diagnoses:   None   Author:   Gary Mays NP      Basic Information   Source of history:  Medical record.    Present at bedside:  Family member, Medical personnel.    History limitation:  None.    Resusitation Status:  Full Code.       History of Present Illness   Mrs. Sanchez is a 58 y/o male, who is known to [facility], Dr. Mendieta. The patient presented to [facility] on 19 for a scheduled LHC.  The patient was last seen in clinic on 19 with complaints of SOB, stress test revealed anterior wall ischemia.  On 19 patient underwent LHC which revealed greater than 80% lesion of the proximal LAD, treated with rotation atherectomy and LEO, of note mild jailing of ostial ramus.  After case patient was having difficulty arousing ABGs were drawn placed on BiPAP for a PCO2 of 56.  Patient has right-sided upper extremity weakness along with right facial droop CT of head revealed no definitive acute intracranial abnormalities.  Neurology was consulted, code fast called. A\ CT of the head showed an acute large left hemisphere left M3/M4 segment occlusion CVA.  She remained on mechanical ventilation since that time and continues to have persistent encephalopathy related to her stroke with persistent lethargy.  She underwent tracheostomy placement by Dr. Gonzales on 19.  Plans were to undergo PEG tube placement, however, she is on Plavix which cannot be stopped per cardiology recommendations.     19: transferred to LTAC  19: Occasional episodes of bradycardia with PACs on monitor, after discussing with the family it could be s/t vagal  PMH: CAD, aortic valve stenosis, mild mitral stenosis, COPD, diabetes type 2, pulmonary emphysema, former smoker, abnormal calcium score, SOB, obesity, depression, bipolar, GERD,  PSH: , hysterectomy,  bilateral tubal ligation  Family Hx: Fatherlung disease, motherHTN, lung tumor; brotherPAD  Social Hx: Former smoker.  Denies alcohol or illicit drug use.    Previous Diagnostics:   Echo (7.10.19)  The study quality is good.   The left ventricle is decreased in size. Global left ventricular systolic function is normal. The left ventricular ejection fraction is 60%. The left ventricle diastolic function is impaired (Grade I) with normal left atrial pressure. Noted left ventricular hypertrophy. Concentric left ventricular hypertrophy is present. It is mild to moderate.   The left atrial diameter is severely increased. Left atrial diameter is 5.0 cms.   Mild to moderate aortic valve stenosis is present. Aortic valve area continuity equation is 1.3 cm². The trans-aortic peak velocity is 2.55 m/s. LVOT Diameter is 1.6 cms. Dimensionless index 0.60.  Evidence of mitral stenosis is noted. Mitral stenosis is mild. Valve appears mild to moderate visually. The area by planimetry is 2.23 cm². The mean trans mitral gradient is 6.1 mmHg.   Mild to moderate mitral annular calcification is noted. Mild calcification of the mitral valve is noted.  Moderate (2+) aortic regurgitation. Moderate (2+) mitral regurgitation. Trace tricuspid regurgitation.   The pulmonary artery systolic pressure is 21 mmHg.     Lexiscan (6.28.19)  This is an abnormal perfusion study. Study is consistent with ischemia.   This scan is suggestive of moderate risk for future cardiovascular events.   Medium reversible perfusion abnormality of moderate intensity in the anterior apical region. Small reversible perfusion abnormality of moderate intensity in the apical septal segment. Small reversible perfusion abnormality of moderate intensity in the apical inferior segment.   The left ventricular cavity is noted to be normal on the stress studies. The stress left ventricular ejection fraction was calculated to be 60% and left ventricular global function is  normal. The rest left ventricular cavity is noted to be normal. The rest left ventricular ejection fraction was calculated to be 59% and rest left ventricular global function is normal.   When compared to the resting ejection fraction (59%), the stress ejection fraction (60%) has increased.   The study quality is good.     [facility] (7.6.18)  No evidence of any obstructive coronary disease  Left main: Large and without significant disease  LAD: LAD shows some calcification at the proximal portion of the vessel with disease about 40 to 50%.  No obstructive disease was found  Ramus: The ramus is a large vessel without significant disease  Left circumflex: The left circumflex is a large vessel with a first OM.  No obstructive lesion  RCA: Dominant, with disease about 20 to 30% and proximal segment         Review of Systems   intubated/sedated      Health Status   Current medications:    Medications (25) Active  Scheduled: (13)  aripiprazole 5 mg Tab UD  10 mg 2 tab(s), NG, At Bedtime  aspirin 81 mg Chew tab  81 mg 1 tab(s), NG, Daily  busPIRone ([facility]) 15 mg Tab  30 mg 2 tab(s), Oral, At Bedtime  clopidogrel 75 mg Tab UD  75 mg 1 tab(s), NG, Daily  ferrous sulfate 325 mg Tab UD  325 mg 1 tab(s), NG, BID  HumaLOG Mix 75/25  30 units 0.3 mL, Subcutaneous, BIDAC  lamotrigine 100 mg Tab  200 mg 2 tab(s), NG, Daily  [facility] venlafaxine 75 mg Tab UD  75 mg 1 tab(s), NG, BID  losartan 50 mg Tab UD  50 mg 1 tab(s), NG, Daily  metoprolol tart. 25 mg Tab UD  25 mg 1 tab(s), NG, BID  nystatin 575468 u/ml Gilma - 60 mL  200,000 units 2 mL, Oral, QID  pantoprazole 40 mg ECGR UD  40 mg 1 packet(s), NG, Daily  zinc oxide(BALMEX) top-crm 2 oz  1 nick, TOP, BID        Physical Examination   General:  Alert.    Eye:  Pupils are equal, round and reactive to light.    HENT:  Normocephalic, NG, Trach in place.    Respiratory:  Respirations are non-labored, Breath sounds are equal, Symmetrical chest wall expansion, Ronchi RLL.    Cardiovascular:  Normal  rate, Regular rhythm, No murmur, Normal peripheral perfusion.    Gastrointestinal:  NG to right nare .       Vital Signs (last 24 hrs)_____  Last Charted___________  Temp Oral     36.5 DegC  (AUG 14 08:20)  Heart Rate Peripheral   84 bpm  (AUG 14 08:20)  Resp Rate         14 br/min  (AUG 14 08:21)  SBP      134 mmHg  (AUG 14 08:20)  DBP      69 mmHg  (AUG 14 08:20)  SpO2      94 %  (AUG 14 08:21)  Weight      93.2 kg  (AUG 14 04:00)     Integumentary:  Warm, Dry, Pink.    Neurologic:  R sided weakness. , R facial droop.       Review / Management   Results review:  All Results   8/14/2019 3:30 CDT       WBC                       8.9 x10(3)/mcL                             RBC                       3.45 x10(6)/mcL  LOW                             Hgb                       9.3 gm/dL  LOW                             Hct                       31.1 %  LOW                             Platelet                  399 x10(3)/mcL                             MCV                       90.1 fL                             MCH                       27.0 pg                             MCHC                      29.9 gm/dL  LOW                             RDW                       16.3 %                             MPV                       9.6 fL                             Abs Neut                  5.80 x10(3)/mcL                             Neutro Auto               65.4 %                             Lymph Auto                25.5 %                             Mono Auto                 5.0 %                             Eos Auto                  3.2 %                             Abs Eos                   0.28 x10(3)/mcL                             Basophil Auto             0.6 %                             Sodium Lvl                142 mmol/L                             Potassium Lvl             3.7 mmol/L                             Chloride                  107 mmol/L                             CO2                       30.0 mmol/L                              Calcium Lvl               8.8 mg/dL                             Glucose Lvl               163 mg/dL  HI                             BUN                       31.0 mg/dL  HI                             Creatinine                0.63 mg/dL                             BUN/Creat Ratio           49  NA                             eGFR-AA                   >60 mL/min/1.73 m2  NA                             eGFR-FABI                  >60 mL/min/1.73 m2  NA                             Troponin-I                <0.015 ng/mL    8/13/2019 22:00 CDT      Troponin-I                <0.015 ng/mL  .       Impression and Plan   IMPRESSION  CAD   [facility] (7.18.19) LEO to LAD, mild jailing of ostial ramus  Acute CVA with clinical deterioration requiring intubation.   CTA of Head/Neck (7.18.19) Findings consistent with vascular occlusion of the left middle cerebral artery in the region of the bifurcation into the 3 vasculature the level of the insula ribbon involving the anterior branch.  There is associated developing ischemic changes without evidence for hemorrhagic conversion.  The area vasculature occlusion is likely secondary to embolic calcified plaque.  Remaining cerebral vasculature appears normal.  50% stenosis of left carotid bulb.  Other areas of atheromatous identified   MRI of Brain (7.19.19)-large left MCA small left cerebellar   S/p trach  VHD   ECHO (EF 60%)    Aortic valve stenosis-mild to mode    Mitral stenosis-mild    Aortic Regurg- moderate    Mitral Regurg-Moderate    Tricuspid Regurg-trace  Anemia- resolved   COPD  DM II  COPD  Depression  GERD    PLAN:  Intermittent bradycardia appears to be vagal related after discussing with the family   Cont ASA and Plavix due to recent LEO to LAD  Cont ARB  Decrease Lopressor to 25mg BID and continue monitoring on telemetry   Please call if any pauses occur or prolonged bradycardia

## 2022-05-04 NOTE — HISTORICAL [facility] CERNER
This is a historical note converted from [employer]. Formatting and pictures may have been removed.  Please reference [employer] for original formatting and attached multimedia. Chief Complaint  Sent by PCP to evaluate for tracheal stenosis  History of Present Illness  Ms. Sanchez is?a 60 year old female with a medical history that includes CAD/stents, VHD, HTN, CVA with residual expressive asphasia and right hemiparesis, COPD, NIDDM2, and Bipolar disorder. She was sent to [facility] ER from her PCP offices for abnormal CT results; PCP told her that her?airway was?the width of a pencil?. She saw her PCP with complaints of cough, difficulty clearing her secretions, stridor, and SOB on exertion over the last 4 weeks. Daughter, at beside, reports she has had intermittent issues with clearing her secretions over the last year since her trach was removed, but never sought evaluation because it wasnt that bad.  She was admitted to [facility] from 7/18/19-7/30/19?after initially being admitted for a scheduled [facility] s/t abnormal stress test; drug-eluting stent placed to LAD. Post [facility], she has RUE weakness with CTA showing vascular occlusion of the left middle cerebral artery with ischemic changes, no intervention.?MRI of brain on 7/19/19 showed left middle cerebral artery territory large acute nonhemorrhagic infarct and left inferior cerebellar small acute nonhemorrhagic infarct. She became more obtunded and was ultimately transferred to the ICU where she was intubated for airway protection and placed on mechanical ventilation. Difficulty getting the patient off the ventilator s/t lethargy, so tracheostomy placed on 7/25/19. She was admitted to LTAC from 7/30/19-10/1/19 for continuation of care including vent weaning, nutrition, and PT/OT/ST. While at LTAC, she was noted to have increased secretions; bronchoscopy showed significant swelling of her cuneiform and corniculate cartilage and soft tissue enlargement were noted prior to her vocal  cords.She was then discharged to [facility] for several weeks, prior to returning home. Per ENT notes, decannulated in October 2019.?  Initial ER VS: /85, , respirations 18, oral temp 36.3, and SPO2 98% on RA.? CBC, CMP unremarkable.? No additional diagnostic imaging was performed while in the ER.? She received IVF while in the ER and admitted to the hospitalist services. ENT consulted in ER; Dr. Gonzales performed flexible nasopharyngoscopy in ED which showed?both true vocal cords appear to be functioning and moving normally and a right-sided tracheal lesion blocking 2/3-3/4 of trachea.?He recommended overnight Sp02 monitoring while sleeping with further recommendations to follow.  Review of Systems  Except as documented, all other systems reviewed and negative.  Physical Exam  Vitals & Measurements  T:?36.3? ?C (Oral)? HR:?96(Peripheral)? HR:?99(Monitored)? RR:?20? BP:?102/70? SpO2:?93%? WT:?98?kg?  General: Appears comfortable, no acute distress.  Cognition and speech:?Oriented, speech clear and coherent.  HEENT:?Normocephalic, normal hearing, oral mucosa is moist.  Neck:?No JVD, trachea at?midline, supple.  Respiratory:?Lungs CTA.?No accessory muscle use. ?Breath sounds are equal. Stridor.  Cardiovascular:?Regular rhythm. Normal S1/S2.? No pedal edema.  Gastrointestinal:?Normoactive bowel sound, soft and non-tender.  Integumentary:?Warm, dry, intact.  Musculoskeletal:?Normal strength, no tenderness, no swelling.  Skin:?Warm and dry, no rashes or lesions.  Neuro:?AAOx3, no focal neurological deficit, normal sensory. Follows commands.  Psych:?Cooperative. Appropriate mood and affect.  ?  Assessment:  Tracheal Stenosis with Stridor  Hypertension, Essential - uncontrolled  NIDDM2  HX: Ischemic CVA - residual expressive aphasia and RUE/RLE weakness  HX: CAD/stent, VHD, COPD, Bipolar/depression/anxiety  ?  Plan:?  - CT Thorax W 9/29/20 - reviewed  - ENT, Dr. Gonzales,?consulted  ----- s/p  flexible nasopharyngoscopy? - both true vocal cords appear to be functioning and moving normally and a right-sided tracheal lesion blocking 2/3-3/4 of trachea  ----- Recommended overnight Sp02 monitoring, further recs to follows  - Continuous Sp02 monitoring  - NPO after 00:00; NS @ 75ml/hr  - PRN antihypertensives  - Accu-Cheks q4hr & PRN while NPO with SSI#2  - RESUME HOME medications when med list obtained/med rec updated  - Repeat labs in am  ?   Source of history: Self. Medical record.?  Present at bedside: None.?  History limitation: None.  Provider information: PCP:?PRAKASH Casanova MD  ?   Code status: Full code  DVT prophylaxis:?SCDs bilaterally (no ACT pending further ENT recommendations)  ?  I, АНДРЕЙ Zaman, reviewed and discussed the case with Dr. Eduin Jimenez. ?  ?------------------  Eduin Jimenez MD  ?   I reviewed the NP documentation, treatment plan, and medical decision making; and I had face-to-face time with this patient.??Labs and imaging were reviewed and I agree with history, physical and medical decision making as detailed above.  ?  Tracheal stenosis, marinating oxygen saturation while awake.  Plan to monitor SPO2?overnight while sleeping  ENT follwoing   Problem List/Past Medical History  CAD/stent  VHD - moderate aortic insufficiency and mitral regurgitation?  Hypertension?  Ischemic CVA - residual expressive aphasia and RUE/RLE weakness  COPD  NIDDM2  Bipolar/depression/anxiety  Reflux  Former Tobacco Use  Procedure/Surgical History  Tracheostomy (.) (07/25/2019)  Left Heart Catheterization: PTCA with Stent & Atherectomy to Proximal LAD (07/18/2019)  Hernia Repair Incisional (Anterior, Torso) (03/28/2018)  Incision & Drainage (Right, Hand) (03/28/2018)  Colonoscopy (12/21/2017)  Hemicolectomy (Left) (11/02/2016)  Cholangiogram (Surgery) (09/29/2016)  Cholecystectomy Laparoscopic (09/29/2016)  Esophagogastroduodenoscopy (None) (09/15/2016)  Esophagogastroduodenoscopy, flexible, transoral; with  biopsy, single or multiple (09/15/2016)   delivery only;  Total abdominal hysterectomy (corpus and cervix), with or without removal of tube(s), with or without removal of ovary(s);  Tubal Ligation   Medications  Inpatient  Normal Saline (0.9% NS) IV 1,000 mL, 1000 mL, IV  Home  albuterol 0.083% inhalation solution, 2.5 mg= 3 mL, NEB, q4hr Resp, PRN  Anucort-HC 25 mg rectal suppository, 25 mg= 1 supp, OK (rectal), TID  ARIPiprazole 5 mg oral tablet, 10 mg= 2 tab(s), NG, At Bedtime  aspirin 81 mg oral tablet, CHEWABLE, 81 mg= 1 tab(s), NG, Daily  Balmex topical cream, TOP, BID  bisacodyl 10 mg RECTAL suppository, 10 mg= 1 supp, OK (rectal), Daily, PRN  busPIRone 15 mg oral tablet, 30 mg= 2 tab(s), Oral, At Bedtime  Feosol, 325 mg= 1 tab(s), NG, BID  Ibuprofen 400 mg tablet, 1 tab(s), Oral, q4hr, PRN  insulin lispro 100 units/mL injectable solution, 2-14 units, Subcutaneous, As Directed, PRN  lamoTRIgine 100 mg oral tablet, 200 mg= 2 tab(s), NG, Daily  losartan 50 mg oral tablet, 50 mg= 1 tab(s), NG, Daily  metformin 500 mg oral tablet, 500 mg= 1 tab(s), Oral, BIDAC  Plavix 75 mg oral tablet, 75 mg= 1 tab(s), NG, Daily  Protonix, 40 mg= 1 EA, IV Piggyback, Daily  venlafaxine 75 mg oral tablet, 75 mg= 1 tab(s), NG, BID  Xanax 0.25 mg oral tablet, 0.25 mg= 1 tab(s), Oral, BID, PRN  Allergies  amoxicillin?(Unknown)  morphine?(rash)  Social History  Denies EtOH or illicit drug use.  Former tobacco use;?1?PPD??35 years, stopped at age 50.  Family History  Alzheimer disease: Mother.  Lung disease.....: Mother and Father.  Lab Results  Labs Last 24 Hours?  ?Chemistry? Hematology/Coagulation?   Sodium Lvl: 140 mmol/L (20 13:40:00) WBC: 7.9 x10(3)/mcL (20 13:40:00)   Potassium Lvl: 4.5 mmol/L (20 13:40:00) RBC: 4.41 x10(6)/mcL (20 13:40:00)   Chloride: 102 mmol/L (20 13:40:00) Hgb:?11.7 gm/dL?Low (20 13:40:00)   CO2: 27 mmol/L (20 13:40:00) Hct: 38.9 % (20 13:40:00)    Calcium Lvl: 9.1 mg/dL (09/29/20 13:40:00) Platelet:?410 x10(3)/mcL?High (09/29/20 13:40:00)   Glucose Lvl:?119 mg/dL?High (09/29/20 13:40:00) MCV: 88.2 fL (09/29/20 13:40:00)   BUN:?8.5 mg/dL?Low (09/29/20 13:40:00) MCH:?26.5 pg?Low (09/29/20 13:40:00)   Creatinine: 0.73 mg/dL (09/29/20 13:40:00) MCHC:?30.1 gm/dL?Low (09/29/20 13:40:00)   eGFR-AA: >60 (09/29/20 13:40:00) RDW: 13.6 % (09/29/20 13:40:00)   eGFR-FABI: >60 (09/29/20 13:40:00) MPV:?8.6 fL?Low (09/29/20 13:40:00)   Bili Total: 0.5 mg/dL (09/29/20 13:40:00) Abs Neut: 5.26 x10(3)/mcL (09/29/20 13:40:00)   Bili Direct: 0.2 mg/dL (09/29/20 13:40:00) Neutro Auto: 67 % (09/29/20 13:40:00)   Bili Indirect: 0.3 mg/dL (09/29/20 13:40:00) Lymph Auto: 26 % (09/29/20 13:40:00)   AST: 15 unit/L (09/29/20 13:40:00) Mono Auto: 6 % (09/29/20 13:40:00)   ALT: 30 unit/L (09/29/20 13:40:00) Eos Auto: 1 % (09/29/20 13:40:00)   Alk Phos: 107 unit/L (09/29/20 13:40:00) Abs Eos: 0.1 x10(3)/mcL (09/29/20 13:40:00)   Total Protein: 7.2 gm/dL (09/29/20 13:40:00) Basophil Auto: 1 % (09/29/20 13:40:00)   Albumin Lvl: 4.1 gm/dL (09/29/20 13:40:00) Abs Neutro: 5.26 x10(3)/mcL (09/29/20 13:40:00)   Globulin: 3.1 gm/dL (09/29/20 13:40:00) Abs Lymph: 2 x10(3)/mcL (09/29/20 13:40:00)   A/G Ratio: 1.3 ratio (09/29/20 13:40:00) Abs Mono: 0.4 x10(3)/mcL (09/29/20 13:40:00)    Abs Baso: 0.1 x10(3)/mcL (09/29/20 13:40:00)   Diagnostic Results  CT Thorax WO 9/29/20:?  IMPRESSION:  1. Focal narrowing at the upper trachea at the thoracic inlet with  linear radiopaque foci anteriorly and interim architectural distortion  suspicious for postsurgical changes. Clinical correlation is indicated  2. Mild soft tissue/mass effect at the posterior right aryepiglottic  region with underlying atherosclerosis within a right common carotid  artery. Endoscopic examination would allow further evaluation  3. Atherosclerosis  4. Thoracolumbar spondylosis  5. Extensive chest  6. Status post cholecystectomy

## 2022-07-08 ENCOUNTER — LAB VISIT (OUTPATIENT)
Dept: LAB | Facility: HOSPITAL | Age: 62
End: 2022-07-08
Attending: INTERNAL MEDICINE
Payer: MEDICARE

## 2022-07-08 DIAGNOSIS — I11.9 MALIGNANT HYPERTENSIVE HEART DISEASE WITHOUT HEART FAILURE: ICD-10-CM

## 2022-07-08 DIAGNOSIS — I15.2 OBESITY, DIABETES, AND HYPERTENSION SYNDROME: ICD-10-CM

## 2022-07-08 DIAGNOSIS — Z79.899 ENCOUNTER FOR LONG-TERM (CURRENT) USE OF OTHER MEDICATIONS: Primary | ICD-10-CM

## 2022-07-08 DIAGNOSIS — E11.69 OBESITY, DIABETES, AND HYPERTENSION SYNDROME: ICD-10-CM

## 2022-07-08 DIAGNOSIS — R32 UNSPECIFIED URINARY INCONTINENCE: ICD-10-CM

## 2022-07-08 DIAGNOSIS — E66.9 OBESITY, DIABETES, AND HYPERTENSION SYNDROME: ICD-10-CM

## 2022-07-08 DIAGNOSIS — E11.59 OBESITY, DIABETES, AND HYPERTENSION SYNDROME: ICD-10-CM

## 2022-07-08 LAB
ALBUMIN SERPL-MCNC: 3.8 GM/DL (ref 3.4–4.8)
ALBUMIN/GLOB SERPL: 1 RATIO (ref 1.1–2)
ALP SERPL-CCNC: 107 UNIT/L (ref 40–150)
ALT SERPL-CCNC: 18 UNIT/L (ref 0–55)
APPEARANCE UR: CLEAR
AST SERPL-CCNC: 11 UNIT/L (ref 5–34)
BASOPHILS # BLD AUTO: 0.09 X10(3)/MCL (ref 0–0.2)
BASOPHILS NFR BLD AUTO: 0.9 %
BILIRUB UR QL STRIP.AUTO: NEGATIVE MG/DL
BILIRUBIN DIRECT+TOT PNL SERPL-MCNC: 0.4 MG/DL
BUN SERPL-MCNC: 18 MG/DL (ref 9.8–20.1)
CALCIUM SERPL-MCNC: 9.6 MG/DL (ref 8.4–10.2)
CHLORIDE SERPL-SCNC: 102 MMOL/L (ref 98–107)
CHOLEST SERPL-MCNC: 189 MG/DL
CHOLEST/HDLC SERPL: 2 {RATIO} (ref 0–5)
CO2 SERPL-SCNC: 28 MMOL/L (ref 23–31)
COLOR UR AUTO: YELLOW
CREAT SERPL-MCNC: 0.9 MG/DL (ref 0.55–1.02)
EOSINOPHIL # BLD AUTO: 0.21 X10(3)/MCL (ref 0–0.9)
EOSINOPHIL NFR BLD AUTO: 2 %
ERYTHROCYTE [DISTWIDTH] IN BLOOD BY AUTOMATED COUNT: 13.3 % (ref 11.5–17)
EST. AVERAGE GLUCOSE BLD GHB EST-MCNC: 122.6 MG/DL
GLOBULIN SER-MCNC: 3.7 GM/DL (ref 2.4–3.5)
GLUCOSE SERPL-MCNC: 95 MG/DL (ref 82–115)
GLUCOSE UR QL STRIP.AUTO: NEGATIVE MG/DL
HBA1C MFR BLD: 5.9 %
HCT VFR BLD AUTO: 43.2 % (ref 37–47)
HDLC SERPL-MCNC: 88 MG/DL (ref 35–60)
HGB BLD-MCNC: 13 GM/DL (ref 12–16)
IMM GRANULOCYTES # BLD AUTO: 0.04 X10(3)/MCL (ref 0–0.04)
IMM GRANULOCYTES NFR BLD AUTO: 0.4 %
KETONES UR QL STRIP.AUTO: ABNORMAL MG/DL
LDLC SERPL CALC-MCNC: 74 MG/DL (ref 50–140)
LEUKOCYTE ESTERASE UR QL STRIP.AUTO: NEGATIVE UNIT/L
LYMPHOCYTES # BLD AUTO: 4.32 X10(3)/MCL (ref 0.6–4.6)
LYMPHOCYTES NFR BLD AUTO: 41.8 %
MCH RBC QN AUTO: 28 PG (ref 27–31)
MCHC RBC AUTO-ENTMCNC: 30.1 MG/DL (ref 33–36)
MCV RBC AUTO: 92.9 FL (ref 80–94)
MONOCYTES # BLD AUTO: 0.55 X10(3)/MCL (ref 0.1–1.3)
MONOCYTES NFR BLD AUTO: 5.3 %
NEUTROPHILS # BLD AUTO: 5.1 X10(3)/MCL (ref 2.1–9.2)
NEUTROPHILS NFR BLD AUTO: 49.6 %
NITRITE UR QL STRIP.AUTO: NEGATIVE
PH UR STRIP.AUTO: 5.5 [PH]
PLATELET # BLD AUTO: 337 X10(3)/MCL (ref 130–400)
PMV BLD AUTO: 8.5 FL (ref 7.4–10.4)
POTASSIUM SERPL-SCNC: 4.2 MMOL/L (ref 3.5–5.1)
PROT SERPL-MCNC: 7.5 GM/DL (ref 5.8–7.6)
PROT UR QL STRIP.AUTO: NEGATIVE MG/DL
RBC # BLD AUTO: 4.65 X10(6)/MCL (ref 4.2–5.4)
RBC UR QL AUTO: NEGATIVE UNIT/L
SODIUM SERPL-SCNC: 138 MMOL/L (ref 136–145)
SP GR UR STRIP.AUTO: 1.02
TRIGL SERPL-MCNC: 136 MG/DL (ref 37–140)
UROBILINOGEN UR STRIP-ACNC: 0.2 MG/DL
VLDLC SERPL CALC-MCNC: 27 MG/DL
WBC # SPEC AUTO: 10.3 X10(3)/MCL (ref 4.5–11.5)

## 2022-07-08 PROCEDURE — 80053 COMPREHEN METABOLIC PANEL: CPT

## 2022-07-08 PROCEDURE — 36415 COLL VENOUS BLD VENIPUNCTURE: CPT

## 2022-07-08 PROCEDURE — 81003 URINALYSIS AUTO W/O SCOPE: CPT

## 2022-07-08 PROCEDURE — 85025 COMPLETE CBC W/AUTO DIFF WBC: CPT

## 2022-07-08 PROCEDURE — 83036 HEMOGLOBIN GLYCOSYLATED A1C: CPT

## 2022-07-08 PROCEDURE — 80061 LIPID PANEL: CPT

## 2022-07-18 ENCOUNTER — HOSPITAL ENCOUNTER (OUTPATIENT)
Dept: RADIOLOGY | Facility: HOSPITAL | Age: 62
Discharge: HOME | End: 2022-07-18
Attending: INTERNAL MEDICINE
Payer: MEDICARE

## 2022-07-18 DIAGNOSIS — Z78.0 POSTMENOPAUSAL: ICD-10-CM

## 2022-07-18 DIAGNOSIS — Z12.31 ENCOUNTER FOR SCREENING MAMMOGRAM FOR BREAST CANCER: ICD-10-CM

## 2022-07-18 PROCEDURE — 77080 DXA BONE DENSITY AXIAL: CPT | Mod: TC

## 2022-08-15 ENCOUNTER — HOSPITAL ENCOUNTER (OUTPATIENT)
Dept: RADIOLOGY | Facility: HOSPITAL | Age: 62
Discharge: HOME | End: 2022-08-15
Attending: INTERNAL MEDICINE
Payer: MEDICARE

## 2022-08-15 PROCEDURE — 77063 BREAST TOMOSYNTHESIS BI: CPT | Mod: 26,,, | Performed by: STUDENT IN AN ORGANIZED HEALTH CARE EDUCATION/TRAINING PROGRAM

## 2022-08-15 PROCEDURE — 77067 MAMMO DIGITAL SCREENING BILAT WITH TOMO: ICD-10-PCS | Mod: 26,,, | Performed by: STUDENT IN AN ORGANIZED HEALTH CARE EDUCATION/TRAINING PROGRAM

## 2022-08-15 PROCEDURE — 77063 MAMMO DIGITAL SCREENING BILAT WITH TOMO: ICD-10-PCS | Mod: 26,,, | Performed by: STUDENT IN AN ORGANIZED HEALTH CARE EDUCATION/TRAINING PROGRAM

## 2022-08-15 PROCEDURE — 77067 SCR MAMMO BI INCL CAD: CPT | Mod: TC

## 2022-08-15 PROCEDURE — 77067 SCR MAMMO BI INCL CAD: CPT | Mod: 26,,, | Performed by: STUDENT IN AN ORGANIZED HEALTH CARE EDUCATION/TRAINING PROGRAM

## 2023-01-05 NOTE — DISCHARGE SUMMARY
DISCHARGE SUMMARY:  This 58-year-old female patient was referred to me by Dr. Garry Casanova because of an incisional hernia of the mid abdomen.  Patient had a sigmoid colon resection for diverticulosis of the sigmoid colon with stricture.  In 2016 she developed a hernia in the mid abdomen.  Hence she was brought in for repair of the same.  During our evaluation it was noted the patient has paronychia also.  I have given antibiotics initially but this condition did not improve, hence during surgery, after completion of the hernia repair we have drained the paronychia also on the right thumb.  The patient has multiple problems including bipolar disease, depression, diabetes mellitus.  On 03/28/2018, patient underwent repair of incisional hernia using SurgiMesh and also had incision and drainage of the paronychia of the right thumb.  She did well.  She was kept overnight mostly because of her psychiatric problems and felt uncomfortable going home.  Following evaluation this morning, I have suggested patient to be discharge since her condition was stable, she was tolerating oral feeding well.  Ambulating well.  Surgical wounds are healthy.  Wound care none.  She is advised to rest at home.  Wound care instructions given.  Continue her psychiatric medications, diabetic medications and prescription for analgesics and antibiotics were given.  Advised to return to back to my office for followup.    FINAL DIAGNOSES:    1. Incisional hernia mid abdomen.    2. Paronychia of right thumb.    3. Bipolar disease.    4. Diabetes mellitus.      OPERATIONS PERFORMED:  03/28/2018 repair of her incisional hernia mid abdomen using SurgiMesh.  Incision and drainage of paronychia of right thumb.        JEFF/MODL   DD: 03/29/2018 2214   DT: 03/30/2018 0819  Job # [id number]    cc: JUAN Mott III, M.D.      Island Pedicle Flap With Canthal Suspension Text: The defect edges were debeveled with a #15 scalpel blade.  Given the location of the defect, shape of the defect and the proximity to free margins an island pedicle advancement flap was deemed most appropriate.  Using a sterile surgical marker, an appropriate advancement flap was drawn incorporating the defect, outlining the appropriate donor tissue and placing the expected incisions within the relaxed skin tension lines where possible. The area thus outlined was incised deep to adipose tissue with a #15 scalpel blade.  The skin margins were undermined to an appropriate distance in all directions around the primary defect and laterally outward around the island pedicle utilizing iris scissors.  There was minimal undermining beneath the pedicle flap. A suspension suture was placed in the canthal tendon to prevent tension and prevent ectropion.

## 2023-10-06 ENCOUNTER — LAB VISIT (OUTPATIENT)
Dept: LAB | Facility: HOSPITAL | Age: 63
End: 2023-10-06
Attending: NURSE PRACTITIONER
Payer: MEDICARE

## 2023-10-06 DIAGNOSIS — Z79.899 ENCOUNTER FOR LONG-TERM (CURRENT) USE OF OTHER MEDICATIONS: Primary | ICD-10-CM

## 2023-10-06 LAB
ALBUMIN SERPL-MCNC: 3.6 G/DL (ref 3.4–4.8)
ALBUMIN/GLOB SERPL: 1 RATIO (ref 1.1–2)
ALP SERPL-CCNC: 107 UNIT/L (ref 40–150)
ALT SERPL-CCNC: 12 UNIT/L (ref 0–55)
AST SERPL-CCNC: 12 UNIT/L (ref 5–34)
BASOPHILS # BLD AUTO: 0.06 X10(3)/MCL
BASOPHILS NFR BLD AUTO: 0.8 %
BILIRUB SERPL-MCNC: 0.6 MG/DL
BUN SERPL-MCNC: 9 MG/DL (ref 9.8–20.1)
CALCIUM SERPL-MCNC: 9.3 MG/DL (ref 8.4–10.2)
CHLORIDE SERPL-SCNC: 101 MMOL/L (ref 98–107)
CHOLEST SERPL-MCNC: 204 MG/DL
CHOLEST/HDLC SERPL: 4 {RATIO} (ref 0–5)
CO2 SERPL-SCNC: 28 MMOL/L (ref 23–31)
CREAT SERPL-MCNC: 0.83 MG/DL (ref 0.55–1.02)
EOSINOPHIL # BLD AUTO: 0.12 X10(3)/MCL (ref 0–0.9)
EOSINOPHIL NFR BLD AUTO: 1.7 %
ERYTHROCYTE [DISTWIDTH] IN BLOOD BY AUTOMATED COUNT: 14.1 % (ref 11.5–17)
GFR SERPLBLD CREATININE-BSD FMLA CKD-EPI: >60 MLS/MIN/1.73/M2
GLOBULIN SER-MCNC: 3.7 GM/DL (ref 2.4–3.5)
GLUCOSE SERPL-MCNC: 105 MG/DL (ref 82–115)
HCT VFR BLD AUTO: 42.4 % (ref 37–47)
HDLC SERPL-MCNC: 50 MG/DL (ref 35–60)
HGB BLD-MCNC: 13.5 G/DL (ref 12–16)
IMM GRANULOCYTES # BLD AUTO: 0.02 X10(3)/MCL (ref 0–0.04)
IMM GRANULOCYTES NFR BLD AUTO: 0.3 %
LDLC SERPL CALC-MCNC: 127 MG/DL (ref 50–140)
LYMPHOCYTES # BLD AUTO: 2.32 X10(3)/MCL (ref 0.6–4.6)
LYMPHOCYTES NFR BLD AUTO: 32.4 %
MCH RBC QN AUTO: 28.2 PG (ref 27–31)
MCHC RBC AUTO-ENTMCNC: 31.8 G/DL (ref 33–36)
MCV RBC AUTO: 88.7 FL (ref 80–94)
MONOCYTES # BLD AUTO: 0.4 X10(3)/MCL (ref 0.1–1.3)
MONOCYTES NFR BLD AUTO: 5.6 %
NEUTROPHILS # BLD AUTO: 4.25 X10(3)/MCL (ref 2.1–9.2)
NEUTROPHILS NFR BLD AUTO: 59.2 %
PLATELET # BLD AUTO: 346 X10(3)/MCL (ref 130–400)
PMV BLD AUTO: 8.6 FL (ref 7.4–10.4)
POTASSIUM SERPL-SCNC: 4.6 MMOL/L (ref 3.5–5.1)
PROT SERPL-MCNC: 7.3 GM/DL (ref 5.8–7.6)
RBC # BLD AUTO: 4.78 X10(6)/MCL (ref 4.2–5.4)
SODIUM SERPL-SCNC: 135 MMOL/L (ref 136–145)
TRIGL SERPL-MCNC: 137 MG/DL (ref 37–140)
VLDLC SERPL CALC-MCNC: 27 MG/DL
WBC # SPEC AUTO: 7.17 X10(3)/MCL (ref 4.5–11.5)

## 2023-10-06 PROCEDURE — 80061 LIPID PANEL: CPT

## 2023-10-06 PROCEDURE — 85025 COMPLETE CBC W/AUTO DIFF WBC: CPT

## 2023-10-06 PROCEDURE — 80053 COMPREHEN METABOLIC PANEL: CPT

## 2023-10-06 PROCEDURE — 36415 COLL VENOUS BLD VENIPUNCTURE: CPT

## 2023-12-26 ENCOUNTER — LAB REQUISITION (OUTPATIENT)
Dept: LAB | Facility: HOSPITAL | Age: 63
End: 2023-12-26
Payer: MEDICARE

## 2023-12-26 DIAGNOSIS — I11.0 HYPERTENSIVE HEART DISEASE WITH HEART FAILURE: ICD-10-CM

## 2023-12-26 DIAGNOSIS — E11.59 TYPE 2 DIABETES MELLITUS WITH OTHER CIRCULATORY COMPLICATIONS: ICD-10-CM

## 2023-12-26 LAB
CREAT UR-MCNC: 141.4 MG/DL (ref 45–106)
MICROALBUMIN UR-MCNC: 15 UG/ML
MICROALBUMIN/CREAT RATIO PNL UR: 10.6 MG/GM CR (ref 0–30)

## 2023-12-26 PROCEDURE — 82043 UR ALBUMIN QUANTITATIVE: CPT | Performed by: INTERNAL MEDICINE

## 2024-07-12 ENCOUNTER — LAB VISIT (OUTPATIENT)
Dept: LAB | Facility: HOSPITAL | Age: 64
End: 2024-07-12
Attending: INTERNAL MEDICINE
Payer: MEDICARE

## 2024-07-12 DIAGNOSIS — E11.59 OBESITY, DIABETES, AND HYPERTENSION SYNDROME: ICD-10-CM

## 2024-07-12 DIAGNOSIS — Z79.899 ENCOUNTER FOR LONG-TERM (CURRENT) USE OF OTHER MEDICATIONS: Primary | ICD-10-CM

## 2024-07-12 DIAGNOSIS — I15.2 OBESITY, DIABETES, AND HYPERTENSION SYNDROME: ICD-10-CM

## 2024-07-12 DIAGNOSIS — E66.9 OBESITY, DIABETES, AND HYPERTENSION SYNDROME: ICD-10-CM

## 2024-07-12 DIAGNOSIS — E11.69 OBESITY, DIABETES, AND HYPERTENSION SYNDROME: ICD-10-CM

## 2024-07-12 DIAGNOSIS — I10 ESSENTIAL HYPERTENSION, MALIGNANT: ICD-10-CM

## 2024-07-12 LAB
ALBUMIN SERPL-MCNC: 3.8 G/DL (ref 3.4–4.8)
ALBUMIN/GLOB SERPL: 1 RATIO (ref 1.1–2)
ALP SERPL-CCNC: 100 UNIT/L (ref 40–150)
ALT SERPL-CCNC: 14 UNIT/L (ref 0–55)
ANION GAP SERPL CALC-SCNC: 9 MEQ/L
AST SERPL-CCNC: 14 UNIT/L (ref 5–34)
BASOPHILS # BLD AUTO: 0.09 X10(3)/MCL
BASOPHILS NFR BLD AUTO: 0.9 %
BILIRUB SERPL-MCNC: 0.4 MG/DL
BUN SERPL-MCNC: 14 MG/DL (ref 9.8–20.1)
CALCIUM SERPL-MCNC: 9.6 MG/DL (ref 8.4–10.2)
CHLORIDE SERPL-SCNC: 101 MMOL/L (ref 98–107)
CHOLEST SERPL-MCNC: 210 MG/DL
CHOLEST/HDLC SERPL: 4 {RATIO} (ref 0–5)
CO2 SERPL-SCNC: 26 MMOL/L (ref 23–31)
CREAT SERPL-MCNC: 0.9 MG/DL (ref 0.55–1.02)
CREAT UR-MCNC: 205.7 MG/DL (ref 45–106)
CREAT/UREA NIT SERPL: 16
EOSINOPHIL # BLD AUTO: 0.13 X10(3)/MCL (ref 0–0.9)
EOSINOPHIL NFR BLD AUTO: 1.3 %
ERYTHROCYTE [DISTWIDTH] IN BLOOD BY AUTOMATED COUNT: 13.6 % (ref 11.5–17)
EST. AVERAGE GLUCOSE BLD GHB EST-MCNC: 125.5 MG/DL
GFR SERPLBLD CREATININE-BSD FMLA CKD-EPI: >60 ML/MIN/1.73/M2
GLOBULIN SER-MCNC: 3.9 GM/DL (ref 2.4–3.5)
GLUCOSE SERPL-MCNC: 95 MG/DL (ref 82–115)
HBA1C MFR BLD: 6 %
HCT VFR BLD AUTO: 43.6 % (ref 37–47)
HDLC SERPL-MCNC: 58 MG/DL (ref 35–60)
HGB BLD-MCNC: 13.3 G/DL (ref 12–16)
IMM GRANULOCYTES # BLD AUTO: 0.03 X10(3)/MCL (ref 0–0.04)
IMM GRANULOCYTES NFR BLD AUTO: 0.3 %
LDLC SERPL CALC-MCNC: 130 MG/DL (ref 50–140)
LYMPHOCYTES # BLD AUTO: 3.23 X10(3)/MCL (ref 0.6–4.6)
LYMPHOCYTES NFR BLD AUTO: 31.7 %
MCH RBC QN AUTO: 28.3 PG (ref 27–31)
MCHC RBC AUTO-ENTMCNC: 30.5 G/DL (ref 33–36)
MCV RBC AUTO: 92.8 FL (ref 80–94)
MICROALBUMIN UR-MCNC: 22.2 UG/ML
MONOCYTES # BLD AUTO: 0.65 X10(3)/MCL (ref 0.1–1.3)
MONOCYTES NFR BLD AUTO: 6.4 %
NEUTROPHILS # BLD AUTO: 6.07 X10(3)/MCL (ref 2.1–9.2)
NEUTROPHILS NFR BLD AUTO: 59.4 %
PLATELET # BLD AUTO: 349 X10(3)/MCL (ref 130–400)
PMV BLD AUTO: 8.5 FL (ref 7.4–10.4)
POTASSIUM SERPL-SCNC: 4.5 MMOL/L (ref 3.5–5.1)
PROT SERPL-MCNC: 7.7 GM/DL (ref 5.8–7.6)
RBC # BLD AUTO: 4.7 X10(6)/MCL (ref 4.2–5.4)
SODIUM SERPL-SCNC: 136 MMOL/L (ref 136–145)
TRIGL SERPL-MCNC: 112 MG/DL (ref 37–140)
TSH SERPL-ACNC: 2.77 UIU/ML (ref 0.35–4.94)
VLDLC SERPL CALC-MCNC: 22 MG/DL
WBC # BLD AUTO: 10.2 X10(3)/MCL (ref 4.5–11.5)

## 2024-07-12 PROCEDURE — 85025 COMPLETE CBC W/AUTO DIFF WBC: CPT

## 2024-07-12 PROCEDURE — 36415 COLL VENOUS BLD VENIPUNCTURE: CPT

## 2024-07-12 PROCEDURE — 80061 LIPID PANEL: CPT

## 2024-07-12 PROCEDURE — 80053 COMPREHEN METABOLIC PANEL: CPT

## 2024-07-12 PROCEDURE — 84443 ASSAY THYROID STIM HORMONE: CPT

## 2024-07-12 PROCEDURE — 82570 ASSAY OF URINE CREATININE: CPT

## 2024-07-12 PROCEDURE — 82043 UR ALBUMIN QUANTITATIVE: CPT

## 2024-07-12 PROCEDURE — 83036 HEMOGLOBIN GLYCOSYLATED A1C: CPT

## 2025-05-06 ENCOUNTER — LAB VISIT (OUTPATIENT)
Dept: LAB | Facility: HOSPITAL | Age: 65
End: 2025-05-06
Attending: INTERNAL MEDICINE
Payer: MEDICARE

## 2025-05-06 DIAGNOSIS — E66.9 OBESITY, DIABETES, AND HYPERTENSION SYNDROME: ICD-10-CM

## 2025-05-06 DIAGNOSIS — I15.2 OBESITY, DIABETES, AND HYPERTENSION SYNDROME: ICD-10-CM

## 2025-05-06 DIAGNOSIS — I10 HYPERTENSION, UNSPECIFIED TYPE: Primary | ICD-10-CM

## 2025-05-06 DIAGNOSIS — E11.69 OBESITY, DIABETES, AND HYPERTENSION SYNDROME: ICD-10-CM

## 2025-05-06 DIAGNOSIS — E55.9 VITAMIN D DEFICIENCY: ICD-10-CM

## 2025-05-06 DIAGNOSIS — E11.59 OBESITY, DIABETES, AND HYPERTENSION SYNDROME: ICD-10-CM

## 2025-05-06 LAB
ALBUMIN SERPL-MCNC: 3.7 G/DL (ref 3.4–4.8)
ALBUMIN/GLOB SERPL: 0.9 RATIO (ref 1.1–2)
ALP SERPL-CCNC: 84 UNIT/L (ref 40–150)
ALT SERPL-CCNC: 11 UNIT/L (ref 0–55)
ANION GAP SERPL CALC-SCNC: 12 MEQ/L
AST SERPL-CCNC: 15 UNIT/L (ref 11–45)
BASOPHILS # BLD AUTO: 0.01 X10(3)/MCL
BASOPHILS NFR BLD AUTO: 0.1 %
BILIRUB SERPL-MCNC: 0.7 MG/DL
BUN SERPL-MCNC: 11 MG/DL (ref 9.8–20.1)
CALCIUM SERPL-MCNC: 9.3 MG/DL (ref 8.4–10.2)
CHLORIDE SERPL-SCNC: 103 MMOL/L (ref 98–107)
CHOLEST SERPL-MCNC: 219 MG/DL
CHOLEST/HDLC SERPL: 3 {RATIO} (ref 0–5)
CO2 SERPL-SCNC: 23 MMOL/L (ref 23–31)
CREAT SERPL-MCNC: 0.7 MG/DL (ref 0.55–1.02)
CREAT UR-MCNC: 61.5 MG/DL (ref 45–106)
CREAT/UREA NIT SERPL: 16
EOSINOPHIL # BLD AUTO: 0.01 X10(3)/MCL (ref 0–0.9)
EOSINOPHIL NFR BLD AUTO: 0.1 %
ERYTHROCYTE [DISTWIDTH] IN BLOOD BY AUTOMATED COUNT: 13.8 % (ref 11.5–17)
EST. AVERAGE GLUCOSE BLD GHB EST-MCNC: 125.5 MG/DL
GFR SERPLBLD CREATININE-BSD FMLA CKD-EPI: >60 ML/MIN/1.73/M2
GLOBULIN SER-MCNC: 4.2 GM/DL (ref 2.4–3.5)
GLUCOSE SERPL-MCNC: 125 MG/DL (ref 82–115)
HBA1C MFR BLD: 6 %
HCT VFR BLD AUTO: 40.5 % (ref 37–47)
HDLC SERPL-MCNC: 75 MG/DL (ref 35–60)
HGB BLD-MCNC: 12.6 G/DL (ref 12–16)
IMM GRANULOCYTES # BLD AUTO: 0.01 X10(3)/MCL (ref 0–0.04)
IMM GRANULOCYTES NFR BLD AUTO: 0.1 %
LDLC SERPL CALC-MCNC: 130 MG/DL (ref 50–140)
LYMPHOCYTES # BLD AUTO: 1.27 X10(3)/MCL (ref 0.6–4.6)
LYMPHOCYTES NFR BLD AUTO: 18.1 %
MCH RBC QN AUTO: 28.3 PG (ref 27–31)
MCHC RBC AUTO-ENTMCNC: 31.1 G/DL (ref 33–36)
MCV RBC AUTO: 90.8 FL (ref 80–94)
MICROALBUMIN UR-MCNC: 12.4 UG/ML
MONOCYTES # BLD AUTO: 0.26 X10(3)/MCL (ref 0.1–1.3)
MONOCYTES NFR BLD AUTO: 3.7 %
NEUTROPHILS # BLD AUTO: 5.44 X10(3)/MCL (ref 2.1–9.2)
NEUTROPHILS NFR BLD AUTO: 77.9 %
PLATELET # BLD AUTO: 295 X10(3)/MCL (ref 130–400)
PMV BLD AUTO: 8.4 FL (ref 7.4–10.4)
POTASSIUM SERPL-SCNC: 5 MMOL/L (ref 3.5–5.1)
PROT SERPL-MCNC: 7.9 GM/DL (ref 5.8–7.6)
RBC # BLD AUTO: 4.46 X10(6)/MCL (ref 4.2–5.4)
SODIUM SERPL-SCNC: 138 MMOL/L (ref 136–145)
TRIGL SERPL-MCNC: 69 MG/DL (ref 37–140)
VLDLC SERPL CALC-MCNC: 14 MG/DL
WBC # BLD AUTO: 7 X10(3)/MCL (ref 4.5–11.5)

## 2025-05-06 PROCEDURE — 36415 COLL VENOUS BLD VENIPUNCTURE: CPT

## 2025-05-06 PROCEDURE — 80053 COMPREHEN METABOLIC PANEL: CPT

## 2025-05-06 PROCEDURE — 82306 VITAMIN D 25 HYDROXY: CPT

## 2025-05-06 PROCEDURE — 85025 COMPLETE CBC W/AUTO DIFF WBC: CPT

## 2025-05-06 PROCEDURE — 82570 ASSAY OF URINE CREATININE: CPT

## 2025-05-06 PROCEDURE — 83036 HEMOGLOBIN GLYCOSYLATED A1C: CPT

## 2025-05-06 PROCEDURE — 82043 UR ALBUMIN QUANTITATIVE: CPT

## 2025-05-06 PROCEDURE — 80061 LIPID PANEL: CPT

## 2025-05-07 LAB — 25(OH)D3+25(OH)D2 SERPL-MCNC: 23 NG/ML (ref 30–80)
